# Patient Record
Sex: FEMALE | Race: WHITE | HISPANIC OR LATINO | URBAN - METROPOLITAN AREA
[De-identification: names, ages, dates, MRNs, and addresses within clinical notes are randomized per-mention and may not be internally consistent; named-entity substitution may affect disease eponyms.]

---

## 2023-10-31 ENCOUNTER — EMERGENCY (EMERGENCY)
Facility: HOSPITAL | Age: 48
LOS: 1 days | Discharge: HOME | End: 2023-10-31
Payer: MEDICAID

## 2023-10-31 VITALS
WEIGHT: 164.02 LBS | OXYGEN SATURATION: 99 % | SYSTOLIC BLOOD PRESSURE: 147 MMHG | HEIGHT: 60 IN | RESPIRATION RATE: 18 BRPM | TEMPERATURE: 99 F | DIASTOLIC BLOOD PRESSURE: 84 MMHG | HEART RATE: 110 BPM

## 2023-10-31 LAB
ALBUMIN SERPL ELPH-MCNC: 4.1 G/DL — SIGNIFICANT CHANGE UP (ref 3.3–5)
ALBUMIN SERPL ELPH-MCNC: 4.1 G/DL — SIGNIFICANT CHANGE UP (ref 3.3–5)
ALP SERPL-CCNC: 86 U/L — SIGNIFICANT CHANGE UP (ref 40–120)
ALP SERPL-CCNC: 86 U/L — SIGNIFICANT CHANGE UP (ref 40–120)
ALT FLD-CCNC: 10 U/L — SIGNIFICANT CHANGE UP (ref 10–45)
ALT FLD-CCNC: 10 U/L — SIGNIFICANT CHANGE UP (ref 10–45)
ANION GAP SERPL CALC-SCNC: 11 MMOL/L — SIGNIFICANT CHANGE UP (ref 5–17)
ANION GAP SERPL CALC-SCNC: 11 MMOL/L — SIGNIFICANT CHANGE UP (ref 5–17)
AST SERPL-CCNC: 13 U/L — SIGNIFICANT CHANGE UP (ref 10–40)
AST SERPL-CCNC: 13 U/L — SIGNIFICANT CHANGE UP (ref 10–40)
BILIRUB SERPL-MCNC: 0.2 MG/DL — SIGNIFICANT CHANGE UP (ref 0.2–1.2)
BILIRUB SERPL-MCNC: 0.2 MG/DL — SIGNIFICANT CHANGE UP (ref 0.2–1.2)
BUN SERPL-MCNC: 11 MG/DL — SIGNIFICANT CHANGE UP (ref 7–23)
BUN SERPL-MCNC: 11 MG/DL — SIGNIFICANT CHANGE UP (ref 7–23)
CALCIUM SERPL-MCNC: 9.4 MG/DL — SIGNIFICANT CHANGE UP (ref 8.4–10.5)
CALCIUM SERPL-MCNC: 9.4 MG/DL — SIGNIFICANT CHANGE UP (ref 8.4–10.5)
CHLORIDE SERPL-SCNC: 107 MMOL/L — SIGNIFICANT CHANGE UP (ref 96–108)
CHLORIDE SERPL-SCNC: 107 MMOL/L — SIGNIFICANT CHANGE UP (ref 96–108)
CO2 SERPL-SCNC: 22 MMOL/L — SIGNIFICANT CHANGE UP (ref 22–31)
CO2 SERPL-SCNC: 22 MMOL/L — SIGNIFICANT CHANGE UP (ref 22–31)
CREAT SERPL-MCNC: 0.54 MG/DL — SIGNIFICANT CHANGE UP (ref 0.5–1.3)
CREAT SERPL-MCNC: 0.54 MG/DL — SIGNIFICANT CHANGE UP (ref 0.5–1.3)
EGFR: 112 ML/MIN/1.73M2 — SIGNIFICANT CHANGE UP
EGFR: 112 ML/MIN/1.73M2 — SIGNIFICANT CHANGE UP
GLUCOSE SERPL-MCNC: 109 MG/DL — HIGH (ref 70–99)
GLUCOSE SERPL-MCNC: 109 MG/DL — HIGH (ref 70–99)
HCG SERPL-ACNC: <2 MIU/ML — SIGNIFICANT CHANGE UP
HCG SERPL-ACNC: <2 MIU/ML — SIGNIFICANT CHANGE UP
HCT VFR BLD CALC: 21.2 % — LOW (ref 34.5–45)
HCT VFR BLD CALC: 21.2 % — LOW (ref 34.5–45)
HGB BLD-MCNC: 5.4 G/DL — CRITICAL LOW (ref 11.5–15.5)
HGB BLD-MCNC: 5.4 G/DL — CRITICAL LOW (ref 11.5–15.5)
MCHC RBC-ENTMCNC: 16.5 PG — LOW (ref 27–34)
MCHC RBC-ENTMCNC: 16.5 PG — LOW (ref 27–34)
MCHC RBC-ENTMCNC: 25.5 GM/DL — LOW (ref 32–36)
MCHC RBC-ENTMCNC: 25.5 GM/DL — LOW (ref 32–36)
MCV RBC AUTO: 64.8 FL — LOW (ref 80–100)
MCV RBC AUTO: 64.8 FL — LOW (ref 80–100)
PLATELET # BLD AUTO: 346 K/UL — SIGNIFICANT CHANGE UP (ref 150–400)
PLATELET # BLD AUTO: 346 K/UL — SIGNIFICANT CHANGE UP (ref 150–400)
POTASSIUM SERPL-MCNC: 3.6 MMOL/L — SIGNIFICANT CHANGE UP (ref 3.5–5.3)
POTASSIUM SERPL-MCNC: 3.6 MMOL/L — SIGNIFICANT CHANGE UP (ref 3.5–5.3)
POTASSIUM SERPL-SCNC: 3.6 MMOL/L — SIGNIFICANT CHANGE UP (ref 3.5–5.3)
POTASSIUM SERPL-SCNC: 3.6 MMOL/L — SIGNIFICANT CHANGE UP (ref 3.5–5.3)
PROT SERPL-MCNC: 6.9 G/DL — SIGNIFICANT CHANGE UP (ref 6–8.3)
PROT SERPL-MCNC: 6.9 G/DL — SIGNIFICANT CHANGE UP (ref 6–8.3)
RBC # BLD: 3.27 M/UL — LOW (ref 3.8–5.2)
RBC # BLD: 3.27 M/UL — LOW (ref 3.8–5.2)
RBC # FLD: 22.6 % — HIGH (ref 10.3–14.5)
RBC # FLD: 22.6 % — HIGH (ref 10.3–14.5)
SODIUM SERPL-SCNC: 140 MMOL/L — SIGNIFICANT CHANGE UP (ref 135–145)
SODIUM SERPL-SCNC: 140 MMOL/L — SIGNIFICANT CHANGE UP (ref 135–145)
WBC # BLD: 5.74 K/UL — SIGNIFICANT CHANGE UP (ref 3.8–10.5)
WBC # BLD: 5.74 K/UL — SIGNIFICANT CHANGE UP (ref 3.8–10.5)
WBC # FLD AUTO: 5.74 K/UL — SIGNIFICANT CHANGE UP (ref 3.8–10.5)
WBC # FLD AUTO: 5.74 K/UL — SIGNIFICANT CHANGE UP (ref 3.8–10.5)

## 2023-10-31 PROCEDURE — 99285 EMERGENCY DEPT VISIT HI MDM: CPT

## 2023-10-31 NOTE — ED PROVIDER NOTE - PROGRESS NOTE DETAILS
hgb noted,  Manasa ID [id number] used to discuss result and blood transfusion risks/ benefits. All questions answered, consent signed and placed in chart.  - Blas Cruz PA-C Attending Shira:  will place in cdu

## 2023-10-31 NOTE — ED PROVIDER NOTE - ATTENDING CONTRIBUTION TO CARE
MD Goldstein:  patient seen and evaluated with the PA.  I was present for key portions of the History and Physical, and I agree with the Impression and Plan.      Patient is a 48-year-old female complaining of anemia.  Patient endorses chronic history of menorrhagia but cannot elaborate as to the underlying cause.    Associated symptoms: Patient endorses fatigue, occasional dyspnea on exertion.  No chest pain, no orthopnea     vital signs: Heart rate 110, blood pressure 147/84, respirations 18, temperature 98.6, O2 sat 100% on room air  Gen: Well appearing adult female in NAD  Head: NC/AT  Neck: trachea midline  Resp:  No distress  Abd: nondistended, nontender to palpation  Pelvic:  Ext: no deformities  Neuro:  A&Ox4 appears non focal  Skin:  Warm and dry as visualized  Psych:  Normal affect and mood adult     Medical decision making:   History and physical concerning for symptomatic anemia due to chronic heavy vaginal bleeding.  Will work-up with CBC, type and screen.  Transvaginal ultrasound.  We will discuss possible transfusion in the CDU MD Goldstein:  patient seen and evaluated with the PA.  I was present for key portions of the History and Physical, and I agree with the Impression and Plan.      Patient is a 48-year-old female complaining of anemia.  Patient endorses chronic history of menorrhagia but cannot elaborate as to the underlying cause.    Associated symptoms: Patient endorses fatigue, occasional dyspnea on exertion.  No chest pain, no orthopnea     vital signs: Heart rate 110, blood pressure 147/84, respirations 18, temperature 98.6, O2 sat 100% on room air  Gen: Well appearing adult female in NAD  Head: NC/AT  Neck: trachea midline  Resp:  No distress  Abd: nondistended, nontender to palpation  Pelvic:  unable to visualize cvx  Ext: no deformities  Neuro:  A&Ox4 appears non focal  Skin:  Warm and dry as visualized  Psych:  Normal affect and mood adult     Medical decision making:   History and physical concerning for symptomatic anemia due to chronic heavy vaginal bleeding.    Will work-up with CBC, type and screen.    Transvaginal ultrasound.    We will discuss possible transfusion in the CDU

## 2023-10-31 NOTE — ED ADULT NURSE NOTE - NSFALLUNIVINTERV_ED_ALL_ED
Bed/Stretcher in lowest position, wheels locked, appropriate side rails in place/Call bell, personal items and telephone in reach/Instruct patient to call for assistance before getting out of bed/chair/stretcher/Non-slip footwear applied when patient is off stretcher/[place] to call system/Physically safe environment - no spills, clutter or unnecessary equipment/Purposeful proactive rounding/Room/bathroom lighting operational, light cord in reach

## 2023-10-31 NOTE — ED PROVIDER NOTE - OBJECTIVE STATEMENT
49 yo female hx of     norethindrone 5mg 49 yo female no reported past medical history presents to ED complaining of abnormal heavy vaginal bleeding since 10/14/13.  Patient reports has had this in the past but never required blood transfusions before.  Reports menstrual period is usually abnormal in nature, sometimes has it twice a month and is usually long, however this current episode of bleeding as long as its ever been.  Saw her primary doctor yesterday who did blood work and noted hemoglobin of "6", gave patient dose of norethindrone and advised to come to ED.  Patient took 2 doses of 5mg norethindrone and noted today bleeding did stop, however patient endorses subjective fatigue, body aches and lightheadedness prompting ED visit.  Denies anticoagulant usage, chest pain, syncope, numbness/tingling, back pain, urinary symptoms.

## 2023-10-31 NOTE — ED PROVIDER NOTE - PRINCIPAL DIAGNOSIS
History  Chief Complaint   Patient presents with    Cough     pt c/o cough, URI symptoms x 6 days  c/o chest tightness during cough  Pt states "everyone in the house is sick" with URI symptoms  47-year-old female with past medical history of asthma (previous hospitalizations, but no ICU stays or intubations), who presents to emergency department for productive cough that has been present for the past 6 days  Patient also complains of symptoms of headaches, nasal congestion, rhinorrhea, sore throat, chest tightness  She also complains of intermittent wheezing when her coughing exacerbations occur  Denies shortness of breath or palpitations or leg pain or swelling  Denies fevers or chills  Patient has attempted to resolve the symptoms at home with over-the-counter cough medications, nasal decongestants, and albuterol inhaler, with minimal relief attained  Does have positive ill contacts at home with family members who have similar type symptoms  History provided by:  Patient   used: No    Cough   Associated symptoms: headaches, rhinorrhea, sore throat and wheezing    Associated symptoms: no chest pain, no chills, no ear pain, no fever, no myalgias, no rash and no shortness of breath        Prior to Admission Medications   Prescriptions Last Dose Informant Patient Reported? Taking?    ARTIFICIAL TEARS 1 4 % ophthalmic solution   Yes No   Sig: instill 1 drop into both eyes four times a day if needed   HYDROcodone-acetaminophen (NORCO) 5-325 mg per tablet   Yes No   Sig: Take 1-2 tablets by mouth every 6 (six) hours   Lansoprazole (PREVACID PO)   Yes No   Sig: Take by mouth   Montelukast Sodium (SINGULAIR PO)   Yes No   Sig: Take by mouth   albuterol (2 5 mg/3 mL) 0 083 % nebulizer solution   Yes Yes   Sig: Inhale   albuterol (PROVENTIL HFA,VENTOLIN HFA) 90 mcg/act inhaler   Yes Yes   Sig: Inhale 2 puffs every 6 (six) hours as needed for wheezing   albuterol (PROVENTIL HFA,VENTOLIN HFA) 90 mcg/act inhaler   Yes Yes   Sig: Inhale 2 puffs every 6 (six) hours as needed for wheezing   bisacodyl (DULCOLAX) 5 mg EC tablet   Yes No   Sig: Take by mouth   docusate sodium (DULCOLAX) 100 mg capsule   Yes No   Sig: Take by mouth daily   doxylamine (UNISOM) 25 MG tablet   Yes No   Sig: Take by mouth   ferrous sulfate 325 (65 Fe) mg tablet   Yes No   Sig: Take 1 tablet by mouth 2 (two) times a day   fluticasone (FLONASE) 50 mcg/act nasal spray   Yes No   Si spray into each nostril daily   ibuprofen (MOTRIN) 600 mg tablet   Yes No   Sig: Take by mouth   naproxen (NAPROSYN) 500 mg tablet   Yes No   Sig: Take 500 mg by mouth   polyethylene glycol (MIRALAX) 17 g packet   No No   Sig: Take 17 g by mouth daily as needed (constipation)      Facility-Administered Medications: None       Past Medical History:   Diagnosis Date    Anemia     Asthma     Bilateral breast cysts     last assessed    Amy Blend Amy Blend 15    resolved   11/3/17     Cervical cancer, FIGO stage I (HCC)     Constipation     Seizures (HCC)     URI (upper respiratory infection)     under additonal type - Chronic       Past Surgical History:   Procedure Laterality Date     SECTION      INGUINAL HERNIA REPAIR      last assessed   12/17/15      SC COLONOSCOPY FLX DX W/COLLJ SPEC WHEN PFRMD N/A 9/15/2017    Procedure: COLONOSCOPY;  Surgeon: Jeremiah Rhodes MD;  Location: [facility]; Service: Gastroenterology    SKIN BIOPSY      last assessed         UMBILICAL HERNIA REPAIR      last assessed   12/17/15     URETHRAL DIVERTICULECTOMY      excision of urethral diverticulum    last assessed   12/17/15       Family History   Problem Relation Age of Onset    Hyperlipidemia Mother     Hypertension Mother    [de-identified] Breast cancer Mother         neoplasm/dx first age 39, mastectomy age 36, did have lymph biopsy positive with recurrance age around age 61    Hyperlipidemia Maternal Grandmother     Hypertension Maternal Grandmother  Leukemia Maternal Grandmother      I have reviewed and agree with the history as documented  Social History     Tobacco Use    Smoking status: Never Smoker    Smokeless tobacco: Never Used   Substance Use Topics    Alcohol use: Yes     Comment: every other day/social per allscript    Drug use: No        Review of Systems   Constitutional: Negative for chills and fever  HENT: Positive for congestion, rhinorrhea and sore throat  Negative for ear pain and trouble swallowing  Eyes: Negative  Respiratory: Positive for cough, chest tightness and wheezing  Negative for choking and shortness of breath  Cardiovascular: Negative for chest pain, palpitations and leg swelling  Gastrointestinal: Negative for abdominal pain, constipation, diarrhea, nausea and vomiting  Genitourinary: Negative for dysuria, flank pain, frequency, hematuria and urgency  Musculoskeletal: Negative for arthralgias, back pain, gait problem, joint swelling, myalgias, neck pain and neck stiffness  Skin: Negative for color change, pallor, rash and wound  Neurological: Positive for headaches  Negative for dizziness, syncope, weakness, light-headedness and numbness  Physical Exam  Physical Exam   Constitutional: She is oriented to person, place, and time  She appears well-developed and well-nourished  No distress  HENT:   Head: Normocephalic and atraumatic  Mouth/Throat: No oropharyngeal exudate  Posterior oropharynx with erythema  No edema or uvular deviation  No have tonsillar hypertrophy or overlying exudates  There is postnasal drip  Bilateral TMs are translucent and nonbulging  Eyes: Pupils are equal, round, and reactive to light  Conjunctivae and EOM are normal    Neck: Normal range of motion  Neck supple  Cardiovascular: Normal rate, regular rhythm and intact distal pulses  Pulmonary/Chest: Effort normal and breath sounds normal  No stridor  She has no wheezes  She has no rales  Abdominal: Soft  Bowel sounds are normal  She exhibits no distension  There is no tenderness  There is no rebound and no guarding  Musculoskeletal: Normal range of motion  She exhibits no edema or tenderness  Lymphadenopathy:     She has cervical adenopathy  Neurological: She is alert and oriented to person, place, and time  No cranial nerve deficit or sensory deficit  She exhibits normal muscle tone  Coordination normal    Skin: Skin is warm and dry  Capillary refill takes less than 2 seconds  She is not diaphoretic  Psychiatric: She has a normal mood and affect  Her behavior is normal    Nursing note and vitals reviewed  Vital Signs  ED Triage Vitals [02/13/19 1310]   Temperature Pulse Respirations Blood Pressure SpO2   (!) 97 4 °F (36 3 °C) 93 18 161/89 100 %      Temp src Heart Rate Source Patient Position - Orthostatic VS BP Location FiO2 (%)   -- Monitor Sitting -- --      Pain Score       6           Vitals:    02/13/19 1310   BP: 161/89   Pulse: 93   Patient Position - Orthostatic VS: Sitting       Visual Acuity      ED Medications  Medications   predniSONE tablet 50 mg (50 mg Oral Given 2/13/19 1419)       Diagnostic Studies  Results Reviewed     None                 XR chest 2 views   ED Interpretation by Thong Sotelo PA-C (02/13 1437)   No acute cardiopulmonary disease  Final Result by Michelle Martinez MD (02/13 1716)      No acute cardiopulmonary disease  This report is in agreement with the preliminary interpretation  Workstation performed: [id number]                    Procedures  Procedures       Phone Contacts  ED Phone Contact    ED Course  ED Course as of Feb 13 1726   Wed Feb 13, 2019   1445 Patient feels much improved following duoneb and prednisone in the Ed  Peak flow after neb is 400                                    MDM  Number of Diagnoses or Management Options  Asthma exacerbation:   Viral URI with cough:   Diagnosis management comments: Differential Diagnosis includes but is not limited to:  Upper respiratory infection, bronchitis, asthma exacerbation, pneumonia  Chest x-ray is negative for acute cardiopulmonary disease  Patient feels much improved following DuoNeb in the emergency department  As patient states that she has been wheezing at home, will start her on a short course of prednisone  Patient also instructed on symptomatic therapy  She has been given a prescription for nebulizer machine as hers broke at home, and she feels that she gains much relief with use of nebulizer instead of inhaler  Patient likely has a mild asthma exacerbation due to upper respiratory infection with cough  Amount and/or Complexity of Data Reviewed  Tests in the radiology section of CPT®: ordered and reviewed  Independent visualization of images, tracings, or specimens: yes        Disposition  Final diagnoses:   Asthma exacerbation   Viral URI with cough     Time reflects when diagnosis was documented in both MDM as applicable and the Disposition within this note     Time User Action Codes Description Comment    2/13/2019  2:45 PM Amber Campbell [J06 9] Upper respiratory infection     2/13/2019  2:45 PM Amber Campbell [J45 901] Asthma exacerbation     2/13/2019  2:46 PM Amber Childers Modify [J45 901] Asthma exacerbation     2/13/2019  2:46 PM Nayana Callaway Remove [J06 9] Upper respiratory infection     2/13/2019  2:46 PM Nayana Callaway Add [J06 9,  B97 89] Viral URI with cough       ED Disposition     ED Disposition Condition Date/Time Comment    Discharge Stable Wed Feb 13, 2019  2:45 PM Genevieve Olivas discharge to home/self care  Follow-up Information     Follow up With Specialties Details Why Contact Info    Tod Bañuelos PA-C Internal Medicine, Physician Assistant  As needed, If symptoms worsen, cough and  E   [address] [facility] [address] [facility]  [phone]            Discharge Medication List as of 2/13/2019  2:52 PM      START taking these medications    Details albuterol (5 mg/mL) 0 5 % nebulizer solution Take 0 5 mL (2 5 mg total) by nebulization every 6 (six) hours as needed for wheezing, Starting Wed 2/13/2019, Print      menthol-cetylpyridinium (CEPACOL) 3 MG lozenge Take 1 lozenge (3 mg total) by mouth as needed for sore throat, Starting Wed 2/13/2019, Print      predniSONE 50 mg tablet Take 1 tablet (50 mg total) by mouth daily, Starting Thu 2/14/2019, Print      pseudoephedrine (SUDAFED) 60 mg tablet Take 1 tablet (60 mg total) by mouth every 8 (eight) hours as needed for congestion, Starting Wed 2/13/2019, Print      Respiratory Therapy Supplies (NEBULIZER COMPRESSOR) KIT by Does not apply route once for 1 dose Please dispense 1 nebulizer machine with mask and tubing for patient , Starting Wed 2/13/2019, Print         CONTINUE these medications which have NOT CHANGED    Details   albuterol (2 5 mg/3 mL) 0 083 % nebulizer solution Inhale, Starting Wed 6/28/2017, Historical Med      !! albuterol (PROVENTIL HFA,VENTOLIN HFA) 90 mcg/act inhaler Inhale 2 puffs every 6 (six) hours as needed for wheezing, Historical Med      !! albuterol (PROVENTIL HFA,VENTOLIN HFA) 90 mcg/act inhaler Inhale 2 puffs every 6 (six) hours as needed for wheezing, Historical Med      ARTIFICIAL TEARS 1 4 % ophthalmic solution instill 1 drop into both eyes four times a day if needed, Historical Med      bisacodyl (DULCOLAX) 5 mg EC tablet Take by mouth, Starting Fri 9/8/2017, Historical Med      docusate sodium (DULCOLAX) 100 mg capsule Take by mouth daily, Starting Thu 9/14/2017, Historical Med      doxylamine (UNISOM) 25 MG tablet Take by mouth, Historical Med      ferrous sulfate 325 (65 Fe) mg tablet Take 1 tablet by mouth 2 (two) times a day, Starting Tue 9/12/2017, Historical Med      fluticasone (FLONASE) 50 mcg/act nasal spray 1 spray into each nostril daily, Historical Med      HYDROcodone-acetaminophen (NORCO) 5-325 mg per tablet Take 1-2 tablets by mouth every 6 (six) hours, Starting Tue 10/27/2015, Historical Med      ibuprofen (MOTRIN) 600 mg tablet Take by mouth, Starting Wed 3/8/2017, Historical Med      Lansoprazole (PREVACID PO) Take by mouth, Historical Med      Montelukast Sodium (SINGULAIR PO) Take by mouth, Historical Med      naproxen (NAPROSYN) 500 mg tablet Take 500 mg by mouth, Starting Tue 10/27/2015, Historical Med      polyethylene glycol (MIRALAX) 17 g packet Take 17 g by mouth daily as needed (constipation), Starting 12/21/2016, Until Discontinued, Print       !! - Potential duplicate medications found  Please discuss with provider  No discharge procedures on file      ED Provider  Electronically Signed by           Genet Correa PA-C  02/13/19 5884 Vaginal bleeding

## 2023-10-31 NOTE — ED ADULT NURSE NOTE - OBJECTIVE STATEMENT
Patient is a 48 year old female complaining of abnormal lab results. Patient reports heavy vaginal bleeding since 10/14/23 - went to her PCP where they did blood work and found her hemoglobin to be "6" was given norethindrone and advised to come to ED. Patient does report bleeding has stopped since taking the medications but still endorsing fatigue, body aches and lightheadedness. On assessment A&Ox4, breathing comfortably on room air, no accessory muscle use, ambulating independently, sinus tach on the cardiac monitor, no JVD, no edema noted, strong bilateral peripheral pulses, abdomen soft nontender, skin warm and normal for race.

## 2023-10-31 NOTE — ED PROVIDER NOTE - CLINICAL SUMMARY MEDICAL DECISION MAKING FREE TEXT BOX
Medical decision making:   History and physical concerning for symptomatic anemia due to chronic heavy vaginal bleeding.    Will work-up with CBC, type and screen.    Transvaginal ultrasound.    We will discuss possible transfusion in the CDU

## 2023-10-31 NOTE — ED ADULT TRIAGE NOTE - CHIEF COMPLAINT QUOTE
Pt c/o "heavy menstrual cycle started on 9/14/23. Had blood work yesterday told Hemoglobin 6.5. + dizzy/lightheaded/tired"

## 2023-11-01 VITALS
OXYGEN SATURATION: 99 % | HEART RATE: 78 BPM | TEMPERATURE: 98 F | RESPIRATION RATE: 16 BRPM | DIASTOLIC BLOOD PRESSURE: 67 MMHG | SYSTOLIC BLOOD PRESSURE: 142 MMHG

## 2023-11-01 LAB
ANISOCYTOSIS BLD QL: SIGNIFICANT CHANGE UP
ANISOCYTOSIS BLD QL: SIGNIFICANT CHANGE UP
BASOPHILS # BLD AUTO: 0.03 K/UL — SIGNIFICANT CHANGE UP (ref 0–0.2)
BASOPHILS # BLD AUTO: 0.03 K/UL — SIGNIFICANT CHANGE UP (ref 0–0.2)
BASOPHILS # BLD AUTO: 0.05 K/UL — SIGNIFICANT CHANGE UP (ref 0–0.2)
BASOPHILS # BLD AUTO: 0.05 K/UL — SIGNIFICANT CHANGE UP (ref 0–0.2)
BASOPHILS NFR BLD AUTO: 0.7 % — SIGNIFICANT CHANGE UP (ref 0–2)
BASOPHILS NFR BLD AUTO: 0.7 % — SIGNIFICANT CHANGE UP (ref 0–2)
BASOPHILS NFR BLD AUTO: 0.9 % — SIGNIFICANT CHANGE UP (ref 0–2)
BASOPHILS NFR BLD AUTO: 0.9 % — SIGNIFICANT CHANGE UP (ref 0–2)
BLD GP AB SCN SERPL QL: NEGATIVE — SIGNIFICANT CHANGE UP
BLD GP AB SCN SERPL QL: NEGATIVE — SIGNIFICANT CHANGE UP
DACRYOCYTES BLD QL SMEAR: SLIGHT — SIGNIFICANT CHANGE UP
DACRYOCYTES BLD QL SMEAR: SLIGHT — SIGNIFICANT CHANGE UP
ELLIPTOCYTES BLD QL SMEAR: SLIGHT — SIGNIFICANT CHANGE UP
ELLIPTOCYTES BLD QL SMEAR: SLIGHT — SIGNIFICANT CHANGE UP
EOSINOPHIL # BLD AUTO: 0.1 K/UL — SIGNIFICANT CHANGE UP (ref 0–0.5)
EOSINOPHIL # BLD AUTO: 0.1 K/UL — SIGNIFICANT CHANGE UP (ref 0–0.5)
EOSINOPHIL # BLD AUTO: 0.13 K/UL — SIGNIFICANT CHANGE UP (ref 0–0.5)
EOSINOPHIL # BLD AUTO: 0.13 K/UL — SIGNIFICANT CHANGE UP (ref 0–0.5)
EOSINOPHIL NFR BLD AUTO: 1.8 % — SIGNIFICANT CHANGE UP (ref 0–6)
EOSINOPHIL NFR BLD AUTO: 1.8 % — SIGNIFICANT CHANGE UP (ref 0–6)
EOSINOPHIL NFR BLD AUTO: 3 % — SIGNIFICANT CHANGE UP (ref 0–6)
EOSINOPHIL NFR BLD AUTO: 3 % — SIGNIFICANT CHANGE UP (ref 0–6)
GIANT PLATELETS BLD QL SMEAR: PRESENT — SIGNIFICANT CHANGE UP
GIANT PLATELETS BLD QL SMEAR: PRESENT — SIGNIFICANT CHANGE UP
HCT VFR BLD CALC: 28.2 % — LOW (ref 34.5–45)
HCT VFR BLD CALC: 28.2 % — LOW (ref 34.5–45)
HGB BLD-MCNC: 7.9 G/DL — LOW (ref 11.5–15.5)
HGB BLD-MCNC: 7.9 G/DL — LOW (ref 11.5–15.5)
HYPOCHROMIA BLD QL: SIGNIFICANT CHANGE UP
HYPOCHROMIA BLD QL: SIGNIFICANT CHANGE UP
IMM GRANULOCYTES NFR BLD AUTO: 1.1 % — HIGH (ref 0–0.9)
IMM GRANULOCYTES NFR BLD AUTO: 1.1 % — HIGH (ref 0–0.9)
LYMPHOCYTES # BLD AUTO: 1.67 K/UL — SIGNIFICANT CHANGE UP (ref 1–3.3)
LYMPHOCYTES # BLD AUTO: 1.67 K/UL — SIGNIFICANT CHANGE UP (ref 1–3.3)
LYMPHOCYTES # BLD AUTO: 2.19 K/UL — SIGNIFICANT CHANGE UP (ref 1–3.3)
LYMPHOCYTES # BLD AUTO: 2.19 K/UL — SIGNIFICANT CHANGE UP (ref 1–3.3)
LYMPHOCYTES # BLD AUTO: 38.1 % — SIGNIFICANT CHANGE UP (ref 13–44)
LYMPHOCYTES # BLD AUTO: 38.1 % — SIGNIFICANT CHANGE UP (ref 13–44)
LYMPHOCYTES # BLD AUTO: 38.2 % — SIGNIFICANT CHANGE UP (ref 13–44)
LYMPHOCYTES # BLD AUTO: 38.2 % — SIGNIFICANT CHANGE UP (ref 13–44)
MANUAL SMEAR VERIFICATION: SIGNIFICANT CHANGE UP
MANUAL SMEAR VERIFICATION: SIGNIFICANT CHANGE UP
MCHC RBC-ENTMCNC: 19.6 PG — LOW (ref 27–34)
MCHC RBC-ENTMCNC: 19.6 PG — LOW (ref 27–34)
MCHC RBC-ENTMCNC: 28 GM/DL — LOW (ref 32–36)
MCHC RBC-ENTMCNC: 28 GM/DL — LOW (ref 32–36)
MCV RBC AUTO: 69.8 FL — LOW (ref 80–100)
MCV RBC AUTO: 69.8 FL — LOW (ref 80–100)
MICROCYTES BLD QL: SIGNIFICANT CHANGE UP
MICROCYTES BLD QL: SIGNIFICANT CHANGE UP
MONOCYTES # BLD AUTO: 0.05 K/UL — SIGNIFICANT CHANGE UP (ref 0–0.9)
MONOCYTES # BLD AUTO: 0.05 K/UL — SIGNIFICANT CHANGE UP (ref 0–0.9)
MONOCYTES # BLD AUTO: 0.36 K/UL — SIGNIFICANT CHANGE UP (ref 0–0.9)
MONOCYTES # BLD AUTO: 0.36 K/UL — SIGNIFICANT CHANGE UP (ref 0–0.9)
MONOCYTES NFR BLD AUTO: 0.9 % — LOW (ref 2–14)
MONOCYTES NFR BLD AUTO: 0.9 % — LOW (ref 2–14)
MONOCYTES NFR BLD AUTO: 8.2 % — SIGNIFICANT CHANGE UP (ref 2–14)
MONOCYTES NFR BLD AUTO: 8.2 % — SIGNIFICANT CHANGE UP (ref 2–14)
NEUTROPHILS # BLD AUTO: 2.14 K/UL — SIGNIFICANT CHANGE UP (ref 1.8–7.4)
NEUTROPHILS # BLD AUTO: 2.14 K/UL — SIGNIFICANT CHANGE UP (ref 1.8–7.4)
NEUTROPHILS # BLD AUTO: 3.34 K/UL — SIGNIFICANT CHANGE UP (ref 1.8–7.4)
NEUTROPHILS # BLD AUTO: 3.34 K/UL — SIGNIFICANT CHANGE UP (ref 1.8–7.4)
NEUTROPHILS NFR BLD AUTO: 48.9 % — SIGNIFICANT CHANGE UP (ref 43–77)
NEUTROPHILS NFR BLD AUTO: 48.9 % — SIGNIFICANT CHANGE UP (ref 43–77)
NEUTROPHILS NFR BLD AUTO: 58.2 % — SIGNIFICANT CHANGE UP (ref 43–77)
NEUTROPHILS NFR BLD AUTO: 58.2 % — SIGNIFICANT CHANGE UP (ref 43–77)
NRBC # BLD: 0 /100 WBCS — SIGNIFICANT CHANGE UP (ref 0–0)
NRBC # BLD: 0 /100 WBCS — SIGNIFICANT CHANGE UP (ref 0–0)
OVALOCYTES BLD QL SMEAR: SLIGHT — SIGNIFICANT CHANGE UP
OVALOCYTES BLD QL SMEAR: SLIGHT — SIGNIFICANT CHANGE UP
PLAT MORPH BLD: NORMAL — SIGNIFICANT CHANGE UP
PLAT MORPH BLD: NORMAL — SIGNIFICANT CHANGE UP
PLATELET # BLD AUTO: 319 K/UL — SIGNIFICANT CHANGE UP (ref 150–400)
PLATELET # BLD AUTO: 319 K/UL — SIGNIFICANT CHANGE UP (ref 150–400)
POIKILOCYTOSIS BLD QL AUTO: SLIGHT — SIGNIFICANT CHANGE UP
POIKILOCYTOSIS BLD QL AUTO: SLIGHT — SIGNIFICANT CHANGE UP
RBC # BLD: 4.04 M/UL — SIGNIFICANT CHANGE UP (ref 3.8–5.2)
RBC # BLD: 4.04 M/UL — SIGNIFICANT CHANGE UP (ref 3.8–5.2)
RBC # FLD: 26.1 % — HIGH (ref 10.3–14.5)
RBC # FLD: 26.1 % — HIGH (ref 10.3–14.5)
RBC BLD AUTO: ABNORMAL
RBC BLD AUTO: ABNORMAL
RH IG SCN BLD-IMP: POSITIVE — SIGNIFICANT CHANGE UP
RH IG SCN BLD-IMP: POSITIVE — SIGNIFICANT CHANGE UP
SCHISTOCYTES BLD QL AUTO: SLIGHT — SIGNIFICANT CHANGE UP
SCHISTOCYTES BLD QL AUTO: SLIGHT — SIGNIFICANT CHANGE UP
WBC # BLD: 4.38 K/UL — SIGNIFICANT CHANGE UP (ref 3.8–10.5)
WBC # BLD: 4.38 K/UL — SIGNIFICANT CHANGE UP (ref 3.8–10.5)
WBC # FLD AUTO: 4.38 K/UL — SIGNIFICANT CHANGE UP (ref 3.8–10.5)
WBC # FLD AUTO: 4.38 K/UL — SIGNIFICANT CHANGE UP (ref 3.8–10.5)

## 2023-11-01 PROCEDURE — 76830 TRANSVAGINAL US NON-OB: CPT

## 2023-11-01 PROCEDURE — P9016: CPT

## 2023-11-01 PROCEDURE — G0378: CPT

## 2023-11-01 PROCEDURE — 99285 EMERGENCY DEPT VISIT HI MDM: CPT | Mod: 25

## 2023-11-01 PROCEDURE — 84702 CHORIONIC GONADOTROPIN TEST: CPT

## 2023-11-01 PROCEDURE — 76830 TRANSVAGINAL US NON-OB: CPT | Mod: 26

## 2023-11-01 PROCEDURE — 86901 BLOOD TYPING SEROLOGIC RH(D): CPT

## 2023-11-01 PROCEDURE — 86850 RBC ANTIBODY SCREEN: CPT

## 2023-11-01 PROCEDURE — 36415 COLL VENOUS BLD VENIPUNCTURE: CPT

## 2023-11-01 PROCEDURE — 85025 COMPLETE CBC W/AUTO DIFF WBC: CPT

## 2023-11-01 PROCEDURE — 80053 COMPREHEN METABOLIC PANEL: CPT

## 2023-11-01 PROCEDURE — 99236 HOSP IP/OBS SAME DATE HI 85: CPT

## 2023-11-01 PROCEDURE — 86900 BLOOD TYPING SEROLOGIC ABO: CPT

## 2023-11-01 PROCEDURE — 36430 TRANSFUSION BLD/BLD COMPNT: CPT

## 2023-11-01 PROCEDURE — 86923 COMPATIBILITY TEST ELECTRIC: CPT

## 2023-11-01 RX ORDER — NORETHINDRONE 0.35 MG/1
5 TABLET ORAL
Refills: 0 | Status: DISCONTINUED | OUTPATIENT
Start: 2023-11-01 | End: 2023-11-04

## 2023-11-01 RX ORDER — ATORVASTATIN CALCIUM 80 MG/1
80 TABLET, FILM COATED ORAL AT BEDTIME
Refills: 0 | Status: DISCONTINUED | OUTPATIENT
Start: 2023-11-01 | End: 2023-11-01

## 2023-11-01 RX ADMIN — NORETHINDRONE 5 MILLIGRAM(S): 0.35 TABLET ORAL at 08:27

## 2023-11-01 NOTE — ED CDU PROVIDER INITIAL DAY NOTE - PROGRESS NOTE DETAILS
CDU PROGRESS NOTE DONIS HDEZ: Patient resting comfortably no acute complaint.  Reports minimal vaginal bleeding.  Is on Aygestin 5 mg twice a day.  Has follow-up with her OB/GYN plan.  Second unit PRBCs completed at this time, pending repeat CBC in approximately 3 hours. CDU PROGRESS NOTE DONIS HDEZ: CBC trending upward appropriately, pt also reports improvement of symptoms. seen by Dr Georgi morris for dc. Will dc with follow up. Discussed plan and return precautions with patient who understands and agrees. All questions answered.

## 2023-11-01 NOTE — ED ADULT NURSE REASSESSMENT NOTE - NS ED NURSE REASSESS COMMENT FT1
Pt received from MIREYA Caballero. Pt A&) X4, oriented to CDU & plan of care discussed. Pt in CDU for Blood transfusion. Second unit of PRBC in progress, tolerating well. As per pt no vaginal bleeding at this time, Pt denies any chest pain, SOB, dizziness or palpitations. V/S stable, pt afebrile,  IV in place, patent and free of signs of infiltration. Pt resting in bed. Safety & comfort measures maintained. Call bell in reach. Will continue to monitor.

## 2023-11-01 NOTE — ED ADULT NURSE REASSESSMENT NOTE - NS ED NURSE REASSESS COMMENT FT1
07.00 Received the Pt from  MIREYA Licea  Pt is Observed for Vag bleed /anemia For Repeat CBC @ 10.30 am  post 2nd unit of blood transfusion . Received the Pt A&OX 4  Pt obeys commands Viviana N/V/D fever chills cp SOB   Comfort care & safety measures continued  IV site looks clean & dry no signs of infiltration noted pt denies  pain IV site .Pt is oriented to the unit Plan of care explained .  Pt is advised to call for help  call bell with in the reach pt verbalized the understanding .  pending CDU  MD lopez . GCS 15/15 A&OX 4 PERRLA  size 3 Strong upper & lower extremities steady gait  Pt is ambulatory & independent   No facial droop  No Hand Leg drop denies numbness tingling   Pt states she is just spotting per vagina Plan of care ongoing

## 2023-11-01 NOTE — ED CDU PROVIDER DISPOSITION NOTE - CLINICAL COURSE
49 yo female no reported past medical history presents to ED complaining of abnormal heavy vaginal bleeding since 10/14/13.  Patient reports has had this in the past but never required blood transfusions before.  Reports menstrual period is usually abnormal in nature, sometimes has it twice a month and is usually long, however this current episode of bleeding as long as its ever been.  Saw her primary doctor yesterday who did blood work and noted hemoglobin of "6", gave patient dose of norethindrone and advised to come to ED.  Patient took 2 doses of 5mg norethindrone and noted today bleeding did stop, however patient endorses subjective fatigue, body aches and lightheadedness prompting ED visit.  Denies anticoagulant usage, chest pain, syncope, numbness/tingling, back pain, urinary symptoms.  In ED, patient had laboratory significant for H/H 5.4/21.2. Pelvic exam per ED consistent w/ BLOOD IN VAGINAL VAULT, scant blood lining vaginal vault. no pooling. Pt consented for transfusion and sent to CDU for frequent reeval, vitals q 4hrs, 2 units PRBC, repeat H/H. 49 yo female no reported past medical history presents to ED complaining of abnormal heavy vaginal bleeding since 10/14/13.  Patient reports has had this in the past but never required blood transfusions before.  Reports menstrual period is usually abnormal in nature, sometimes has it twice a month and is usually long, however this current episode of bleeding as long as its ever been.  Saw her primary doctor yesterday who did blood work and noted hemoglobin of "6", gave patient dose of norethindrone and advised to come to ED.  Patient took 2 doses of 5mg norethindrone and noted today bleeding did stop, however patient endorses subjective fatigue, body aches and lightheadedness prompting ED visit.  Denies anticoagulant usage, chest pain, syncope, numbness/tingling, back pain, urinary symptoms.  In ED, patient had laboratory significant for H/H 5.4/21.2. Pelvic exam per ED consistent w/ BLOOD IN VAGINAL VAULT, scant blood lining vaginal vault. no pooling. Pt consented for transfusion and sent to CDU for frequent reeval, vitals q 4hrs, 2 units PRBC, repeat H/H.  In CDU H/H uptrended appropriately with transfusion of PRBC x2, vitals stable. Discussed plan and return precautions with patient who understands and agrees. All questions answered.

## 2023-11-01 NOTE — ED CDU PROVIDER DISPOSITION NOTE - NSFOLLOWUPINSTRUCTIONS_ED_ALL_ED_FT
(1) You will need to follow-up with your PMD () in 2-3 days for your anemia. A copy of your results were given to you to bring to your appt.  (2) Read attached discharge paperwork.  (3) Drink plenty of fluids to stay hydrated.  (4) Return to ER for lightheaded/dizziness, chest pain, palpitations, shortness of breath, active bleeding, or any other concerns. Please follow up with your primary care doctor within 1 week.  Follow up with your OBGYN within 1 week.    *Bring all printed lab/test results to your appointment(s).*    Continue to take oral iron supplements daily.    Return for worsening heavy bleeding, chest pain, shortness of breath, dizziness, loss of consciousness, or any other concerns. Please follow up with your primary care doctor and your OBGYN within 2-3 days.    *Bring all printed lab/test results to your appointment(s).*    Continue to take oral iron supplements daily.    Return for worsening heavy bleeding, chest pain, shortness of breath, dizziness, loss of consciousness, or any other concerns.

## 2023-11-01 NOTE — ED CDU PROVIDER INITIAL DAY NOTE - CLINICAL SUMMARY MEDICAL DECISION MAKING FREE TEXT BOX
Medical decision making:   History and physical concerning for symptomatic anemia due to chronic heavy vaginal bleeding.  Will work-up with CBC, type and screen.  Transvaginal ultrasound.  We will discuss possible transfusion in the CDU.

## 2023-11-01 NOTE — ED CDU PROVIDER INITIAL DAY NOTE - ATTENDING APP SHARED VISIT CONTRIBUTION OF CARE
MD Goldstein:  I have personally performed a face to face diagnostic evaluation on this patient with the PA.  I have reviewed the ACP note and agree with the history, exam, and plan of care, except as noted.  History and Exam by me shows:     48-year-old female complaining of anemia.  Patient endorses chronic history of menorrhagia but cannot elaborate as to the underlying cause.    Associated symptoms: Patient endorses fatigue, occasional dyspnea on exertion.  No chest pain, no orthopnea     vital signs: Heart rate 110, blood pressure 147/84, respirations 18, temperature 98.6, O2 sat 100% on room air  Gen: Well appearing adult female in NAD  Head: NC/AT  Neck: trachea midline  Resp:  No distress  Abd: nondistended, nontender to palpation  Ext: no deformities  Neuro:  A&Ox4 appears non focal  Skin:  Warm and dry as visualized  Psych:  Normal affect and mood adult     Medical decision making:   History and physical concerning for symptomatic anemia due to chronic heavy vaginal bleeding.  Will work-up with CBC, type and screen.  Transvaginal ultrasound.  We will discuss possible transfusion in the CDU.

## 2023-11-01 NOTE — ED CDU PROVIDER DISPOSITION NOTE - ATTENDING APP SHARED VISIT CONTRIBUTION OF CARE
Jose Laureano MD:   I personally saw the patient and performed a substantive portion of the visit including all aspects of the medical decision making.    Summary: 48-year-old female who presents with abnormal heavy vaginal bleeding for last 2 weeks, and has had a history of this in the past but has not required blood transfusions before.  Reports abnormal menstrual periods, sometimes twice a month and usually prolonged.    In the ED, found to have H&H of 5.4/21.2, was consented for blood. Transvaginal ultrasound showed fibroid uterus, thickened heterogeneous endometrium with findings that may be related to menstrual changes versus endometrial carcinoma, endometrial hyperplasia or polyps.    Patient was placed in the CDU for further monitoring, frequent reassessments, Q4 hour vital signs, 2 units of PRBC, repeat H&H.      Patient was evaluated by me in the morning, and feels improved symptoms after receiving PRBC.  On examination, patient with stable vitals, mild conjunctival pallor, no abdominal tenderness, negative CVA tenderness.    Patient was given a copy of their results, and we have discussed all abnormal findings on the labs and imaging with the patient. They are aware of the findings of anemia and abnormal ultrasound, and requires rapid follow-up within 2 weeks for repeat imaging because endometrial cancer is a possibility. They show good understanding, and are agreeable to follow-up with her OB/GYN and primary care physician for further evaluation to address these abnormal findings. Jose Laureano MD:   I personally saw the patient and performed a substantive portion of the visit including all aspects of the medical decision making.    Summary: 48-year-old female who presents with abnormal heavy vaginal bleeding for last 2 weeks, and has had a history of this in the past but has not required blood transfusions before.  Reports abnormal menstrual periods, sometimes twice a month and usually prolonged.    In the ED, found to have H&H of 5.4/21.2, was consented for blood. Transvaginal ultrasound showed fibroid uterus, thickened heterogeneous endometrium with findings that may be related to menstrual changes versus endometrial carcinoma, endometrial hyperplasia or polyps.    Patient was placed in the CDU for further monitoring, frequent reassessments, Q4 hour vital signs, 2 units of PRBC, repeat H&H.      Patient was evaluated by me in the morning, and feels improved symptoms after receiving PRBC.  On examination, patient with stable vitals, mild conjunctival pallor, no abdominal tenderness, negative CVA tenderness. Repeat H&H showed improvement from 5.4-7.9, responded appropriately.  Patient continues to feel well.  Changes position without any orthostatic symptoms.  Stable vitals.    Patient was given a copy of their results, and we have discussed all abnormal findings on the labs and imaging with the patient. They are aware of the findings of anemia and abnormal ultrasound, and requires rapid follow-up within 2 weeks for repeat imaging because endometrial cancer is a possibility. They show good understanding, and are agreeable to follow-up with her OB/GYN and primary care physician for further evaluation to address these abnormal findings.    Stable for discharge with close follow-up and strict return precautions. Discussed the indications and side-effects of applicable medications. The patient has been informed of all concerning signs and symptoms to return to Emergency Department, the necessity to follow up with PMD and OB/GYN within 2-3 days was explained, or to return to the ED if unable to follow-up appropriately, and the patient reports understanding of above with capacity and insight.

## 2023-11-01 NOTE — ED ADULT NURSE REASSESSMENT NOTE - NSFALLUNIVINTERV_ED_ALL_ED
Bed/Stretcher in lowest position, wheels locked, appropriate side rails in place/Call bell, personal items and telephone in reach/Instruct patient to call for assistance before getting out of bed/chair/stretcher/Non-slip footwear applied when patient is off stretcher/[place] to call system/Physically safe environment - no spills, clutter or unnecessary equipment/Purposeful proactive rounding/Room/bathroom lighting operational, light cord in reach

## 2023-11-01 NOTE — ED CDU PROVIDER INITIAL DAY NOTE - OBJECTIVE STATEMENT
47 yo female no reported past medical history presents to ED complaining of abnormal heavy vaginal bleeding since 10/14/13.  Patient reports has had this in the past but never required blood transfusions before.  Reports menstrual period is usually abnormal in nature, sometimes has it twice a month and is usually long, however this current episode of bleeding as long as its ever been.  Saw her primary doctor yesterday who did blood work and noted hemoglobin of "6", gave patient dose of norethindrone and advised to come to ED.  Patient took 2 doses of 5mg norethindrone and noted today bleeding did stop, however patient endorses subjective fatigue, body aches and lightheadedness prompting ED visit.  Denies anticoagulant usage, chest pain, syncope, numbness/tingling, back pain, urinary symptoms.  In ED, patient had laboratory significant for H/H 5.4/21.2. Pelvic exam per ED consistent w/ BLOOD IN VAGINAL VAULT, scant blood lining vaginal vault. no pooling. Pt consented for transfusion and sent to CDU for frequent reeval, vitals q 4hrs, 2 units PRBC, repeat H/H.

## 2023-11-19 ENCOUNTER — EMERGENCY (EMERGENCY)
Facility: HOSPITAL | Age: 48
LOS: 1 days | Discharge: HOME | End: 2023-11-19
Attending: EMERGENCY MEDICINE
Payer: MEDICAID

## 2023-11-19 VITALS
TEMPERATURE: 99 F | DIASTOLIC BLOOD PRESSURE: 75 MMHG | OXYGEN SATURATION: 100 % | WEIGHT: 164.02 LBS | RESPIRATION RATE: 17 BRPM | HEART RATE: 97 BPM | HEIGHT: 60 IN | SYSTOLIC BLOOD PRESSURE: 175 MMHG

## 2023-11-19 LAB
ALBUMIN SERPL ELPH-MCNC: 4.4 G/DL — SIGNIFICANT CHANGE UP (ref 3.3–5)
ALBUMIN SERPL ELPH-MCNC: 4.4 G/DL — SIGNIFICANT CHANGE UP (ref 3.3–5)
ALP SERPL-CCNC: 84 U/L — SIGNIFICANT CHANGE UP (ref 40–120)
ALP SERPL-CCNC: 84 U/L — SIGNIFICANT CHANGE UP (ref 40–120)
ALT FLD-CCNC: 13 U/L — SIGNIFICANT CHANGE UP (ref 10–45)
ALT FLD-CCNC: 13 U/L — SIGNIFICANT CHANGE UP (ref 10–45)
ANISOCYTOSIS BLD QL: SIGNIFICANT CHANGE UP
ANISOCYTOSIS BLD QL: SIGNIFICANT CHANGE UP
APPEARANCE UR: ABNORMAL
APPEARANCE UR: ABNORMAL
APTT BLD: 30 SEC — SIGNIFICANT CHANGE UP (ref 24.5–35.6)
APTT BLD: 30 SEC — SIGNIFICANT CHANGE UP (ref 24.5–35.6)
AST SERPL-CCNC: 14 U/L — SIGNIFICANT CHANGE UP (ref 10–40)
AST SERPL-CCNC: 14 U/L — SIGNIFICANT CHANGE UP (ref 10–40)
BACTERIA # UR AUTO: ABNORMAL /HPF
BACTERIA # UR AUTO: ABNORMAL /HPF
BASOPHILS # BLD AUTO: 0 K/UL — SIGNIFICANT CHANGE UP (ref 0–0.2)
BASOPHILS # BLD AUTO: 0 K/UL — SIGNIFICANT CHANGE UP (ref 0–0.2)
BASOPHILS # BLD AUTO: 0.02 K/UL — SIGNIFICANT CHANGE UP (ref 0–0.2)
BASOPHILS # BLD AUTO: 0.02 K/UL — SIGNIFICANT CHANGE UP (ref 0–0.2)
BASOPHILS NFR BLD AUTO: 0 % — SIGNIFICANT CHANGE UP (ref 0–2)
BASOPHILS NFR BLD AUTO: 0 % — SIGNIFICANT CHANGE UP (ref 0–2)
BASOPHILS NFR BLD AUTO: 0.3 % — SIGNIFICANT CHANGE UP (ref 0–2)
BASOPHILS NFR BLD AUTO: 0.3 % — SIGNIFICANT CHANGE UP (ref 0–2)
BILIRUB SERPL-MCNC: 0.2 MG/DL — SIGNIFICANT CHANGE UP (ref 0.2–1.2)
BILIRUB SERPL-MCNC: 0.2 MG/DL — SIGNIFICANT CHANGE UP (ref 0.2–1.2)
BILIRUB UR-MCNC: ABNORMAL
BILIRUB UR-MCNC: ABNORMAL
BLD GP AB SCN SERPL QL: NEGATIVE — SIGNIFICANT CHANGE UP
BLD GP AB SCN SERPL QL: NEGATIVE — SIGNIFICANT CHANGE UP
BUN SERPL-MCNC: 13 MG/DL — SIGNIFICANT CHANGE UP (ref 7–23)
BUN SERPL-MCNC: 13 MG/DL — SIGNIFICANT CHANGE UP (ref 7–23)
CALCIUM SERPL-MCNC: 8.9 MG/DL — SIGNIFICANT CHANGE UP (ref 8.4–10.5)
CALCIUM SERPL-MCNC: 8.9 MG/DL — SIGNIFICANT CHANGE UP (ref 8.4–10.5)
CAST: 0 /LPF — SIGNIFICANT CHANGE UP (ref 0–4)
CAST: 0 /LPF — SIGNIFICANT CHANGE UP (ref 0–4)
CHLORIDE SERPL-SCNC: 106 MMOL/L — SIGNIFICANT CHANGE UP (ref 96–108)
CHLORIDE SERPL-SCNC: 106 MMOL/L — SIGNIFICANT CHANGE UP (ref 96–108)
CO2 SERPL-SCNC: 22 MMOL/L — SIGNIFICANT CHANGE UP (ref 22–31)
CO2 SERPL-SCNC: 22 MMOL/L — SIGNIFICANT CHANGE UP (ref 22–31)
COLOR SPEC: ABNORMAL
COLOR SPEC: ABNORMAL
CREAT SERPL-MCNC: 0.67 MG/DL — SIGNIFICANT CHANGE UP (ref 0.5–1.3)
CREAT SERPL-MCNC: 0.67 MG/DL — SIGNIFICANT CHANGE UP (ref 0.5–1.3)
DACRYOCYTES BLD QL SMEAR: SLIGHT — SIGNIFICANT CHANGE UP
DACRYOCYTES BLD QL SMEAR: SLIGHT — SIGNIFICANT CHANGE UP
DIFF PNL FLD: ABNORMAL
DIFF PNL FLD: ABNORMAL
EGFR: 106 ML/MIN/1.73M2 — SIGNIFICANT CHANGE UP
EGFR: 106 ML/MIN/1.73M2 — SIGNIFICANT CHANGE UP
ELLIPTOCYTES BLD QL SMEAR: SIGNIFICANT CHANGE UP
ELLIPTOCYTES BLD QL SMEAR: SIGNIFICANT CHANGE UP
EOSINOPHIL # BLD AUTO: 0 K/UL — SIGNIFICANT CHANGE UP (ref 0–0.5)
EOSINOPHIL # BLD AUTO: 0 K/UL — SIGNIFICANT CHANGE UP (ref 0–0.5)
EOSINOPHIL # BLD AUTO: 0.11 K/UL — SIGNIFICANT CHANGE UP (ref 0–0.5)
EOSINOPHIL # BLD AUTO: 0.11 K/UL — SIGNIFICANT CHANGE UP (ref 0–0.5)
EOSINOPHIL NFR BLD AUTO: 0 % — SIGNIFICANT CHANGE UP (ref 0–6)
EOSINOPHIL NFR BLD AUTO: 0 % — SIGNIFICANT CHANGE UP (ref 0–6)
EOSINOPHIL NFR BLD AUTO: 1.8 % — SIGNIFICANT CHANGE UP (ref 0–6)
EOSINOPHIL NFR BLD AUTO: 1.8 % — SIGNIFICANT CHANGE UP (ref 0–6)
GIANT PLATELETS BLD QL SMEAR: PRESENT — SIGNIFICANT CHANGE UP
GIANT PLATELETS BLD QL SMEAR: PRESENT — SIGNIFICANT CHANGE UP
GLUCOSE SERPL-MCNC: 105 MG/DL — HIGH (ref 70–99)
GLUCOSE SERPL-MCNC: 105 MG/DL — HIGH (ref 70–99)
GLUCOSE UR QL: NEGATIVE MG/DL — SIGNIFICANT CHANGE UP
GLUCOSE UR QL: NEGATIVE MG/DL — SIGNIFICANT CHANGE UP
HCG SERPL-ACNC: <2 MIU/ML — SIGNIFICANT CHANGE UP
HCG SERPL-ACNC: <2 MIU/ML — SIGNIFICANT CHANGE UP
HCT VFR BLD CALC: 27.7 % — LOW (ref 34.5–45)
HCT VFR BLD CALC: 27.7 % — LOW (ref 34.5–45)
HCT VFR BLD CALC: 28.5 % — LOW (ref 34.5–45)
HCT VFR BLD CALC: 28.5 % — LOW (ref 34.5–45)
HCT VFR BLD CALC: 32.3 % — LOW (ref 34.5–45)
HCT VFR BLD CALC: 32.3 % — LOW (ref 34.5–45)
HGB BLD-MCNC: 7.7 G/DL — LOW (ref 11.5–15.5)
HGB BLD-MCNC: 7.7 G/DL — LOW (ref 11.5–15.5)
HGB BLD-MCNC: 8.4 G/DL — LOW (ref 11.5–15.5)
HGB BLD-MCNC: 8.4 G/DL — LOW (ref 11.5–15.5)
HGB BLD-MCNC: 8.9 G/DL — LOW (ref 11.5–15.5)
HGB BLD-MCNC: 8.9 G/DL — LOW (ref 11.5–15.5)
HYPOCHROMIA BLD QL: SIGNIFICANT CHANGE UP
HYPOCHROMIA BLD QL: SIGNIFICANT CHANGE UP
IMM GRANULOCYTES NFR BLD AUTO: 0.3 % — SIGNIFICANT CHANGE UP (ref 0–0.9)
IMM GRANULOCYTES NFR BLD AUTO: 0.3 % — SIGNIFICANT CHANGE UP (ref 0–0.9)
INR BLD: 1.07 RATIO — SIGNIFICANT CHANGE UP (ref 0.85–1.18)
INR BLD: 1.07 RATIO — SIGNIFICANT CHANGE UP (ref 0.85–1.18)
IRON SATN MFR SERPL: 15 UG/DL — LOW (ref 30–160)
IRON SATN MFR SERPL: 15 UG/DL — LOW (ref 30–160)
KETONES UR-MCNC: NEGATIVE MG/DL — SIGNIFICANT CHANGE UP
KETONES UR-MCNC: NEGATIVE MG/DL — SIGNIFICANT CHANGE UP
LEUKOCYTE ESTERASE UR-ACNC: ABNORMAL
LEUKOCYTE ESTERASE UR-ACNC: ABNORMAL
LYMPHOCYTES # BLD AUTO: 1.24 K/UL — SIGNIFICANT CHANGE UP (ref 1–3.3)
LYMPHOCYTES # BLD AUTO: 1.24 K/UL — SIGNIFICANT CHANGE UP (ref 1–3.3)
LYMPHOCYTES # BLD AUTO: 1.97 K/UL — SIGNIFICANT CHANGE UP (ref 1–3.3)
LYMPHOCYTES # BLD AUTO: 1.97 K/UL — SIGNIFICANT CHANGE UP (ref 1–3.3)
LYMPHOCYTES # BLD AUTO: 21.9 % — SIGNIFICANT CHANGE UP (ref 13–44)
LYMPHOCYTES # BLD AUTO: 21.9 % — SIGNIFICANT CHANGE UP (ref 13–44)
LYMPHOCYTES # BLD AUTO: 31.9 % — SIGNIFICANT CHANGE UP (ref 13–44)
LYMPHOCYTES # BLD AUTO: 31.9 % — SIGNIFICANT CHANGE UP (ref 13–44)
MAGNESIUM SERPL-MCNC: 2.3 MG/DL — SIGNIFICANT CHANGE UP (ref 1.6–2.6)
MAGNESIUM SERPL-MCNC: 2.3 MG/DL — SIGNIFICANT CHANGE UP (ref 1.6–2.6)
MANUAL SMEAR VERIFICATION: SIGNIFICANT CHANGE UP
MANUAL SMEAR VERIFICATION: SIGNIFICANT CHANGE UP
MCHC RBC-ENTMCNC: 19.3 PG — LOW (ref 27–34)
MCHC RBC-ENTMCNC: 19.3 PG — LOW (ref 27–34)
MCHC RBC-ENTMCNC: 19.5 PG — LOW (ref 27–34)
MCHC RBC-ENTMCNC: 19.5 PG — LOW (ref 27–34)
MCHC RBC-ENTMCNC: 21.3 PG — LOW (ref 27–34)
MCHC RBC-ENTMCNC: 21.3 PG — LOW (ref 27–34)
MCHC RBC-ENTMCNC: 27.6 GM/DL — LOW (ref 32–36)
MCHC RBC-ENTMCNC: 27.6 GM/DL — LOW (ref 32–36)
MCHC RBC-ENTMCNC: 27.8 GM/DL — LOW (ref 32–36)
MCHC RBC-ENTMCNC: 27.8 GM/DL — LOW (ref 32–36)
MCHC RBC-ENTMCNC: 29.5 GM/DL — LOW (ref 32–36)
MCHC RBC-ENTMCNC: 29.5 GM/DL — LOW (ref 32–36)
MCV RBC AUTO: 70.2 FL — LOW (ref 80–100)
MCV RBC AUTO: 70.2 FL — LOW (ref 80–100)
MCV RBC AUTO: 70.3 FL — LOW (ref 80–100)
MCV RBC AUTO: 70.3 FL — LOW (ref 80–100)
MCV RBC AUTO: 72.3 FL — LOW (ref 80–100)
MCV RBC AUTO: 72.3 FL — LOW (ref 80–100)
MICROCYTES BLD QL: SIGNIFICANT CHANGE UP
MICROCYTES BLD QL: SIGNIFICANT CHANGE UP
MONOCYTES # BLD AUTO: 0.35 K/UL — SIGNIFICANT CHANGE UP (ref 0–0.9)
MONOCYTES # BLD AUTO: 0.35 K/UL — SIGNIFICANT CHANGE UP (ref 0–0.9)
MONOCYTES # BLD AUTO: 0.41 K/UL — SIGNIFICANT CHANGE UP (ref 0–0.9)
MONOCYTES # BLD AUTO: 0.41 K/UL — SIGNIFICANT CHANGE UP (ref 0–0.9)
MONOCYTES NFR BLD AUTO: 6.2 % — SIGNIFICANT CHANGE UP (ref 2–14)
MONOCYTES NFR BLD AUTO: 6.2 % — SIGNIFICANT CHANGE UP (ref 2–14)
MONOCYTES NFR BLD AUTO: 6.6 % — SIGNIFICANT CHANGE UP (ref 2–14)
MONOCYTES NFR BLD AUTO: 6.6 % — SIGNIFICANT CHANGE UP (ref 2–14)
NEUTROPHILS # BLD AUTO: 3.65 K/UL — SIGNIFICANT CHANGE UP (ref 1.8–7.4)
NEUTROPHILS # BLD AUTO: 3.65 K/UL — SIGNIFICANT CHANGE UP (ref 1.8–7.4)
NEUTROPHILS # BLD AUTO: 4.08 K/UL — SIGNIFICANT CHANGE UP (ref 1.8–7.4)
NEUTROPHILS # BLD AUTO: 4.08 K/UL — SIGNIFICANT CHANGE UP (ref 1.8–7.4)
NEUTROPHILS NFR BLD AUTO: 59.1 % — SIGNIFICANT CHANGE UP (ref 43–77)
NEUTROPHILS NFR BLD AUTO: 59.1 % — SIGNIFICANT CHANGE UP (ref 43–77)
NEUTROPHILS NFR BLD AUTO: 71.9 % — SIGNIFICANT CHANGE UP (ref 43–77)
NEUTROPHILS NFR BLD AUTO: 71.9 % — SIGNIFICANT CHANGE UP (ref 43–77)
NITRITE UR-MCNC: POSITIVE
NITRITE UR-MCNC: POSITIVE
NRBC # BLD: 0 /100 WBCS — SIGNIFICANT CHANGE UP (ref 0–0)
PH UR: 5.5 — SIGNIFICANT CHANGE UP (ref 5–8)
PH UR: 5.5 — SIGNIFICANT CHANGE UP (ref 5–8)
PLAT MORPH BLD: ABNORMAL
PLAT MORPH BLD: ABNORMAL
PLATELET # BLD AUTO: 327 K/UL — SIGNIFICANT CHANGE UP (ref 150–400)
PLATELET # BLD AUTO: 327 K/UL — SIGNIFICANT CHANGE UP (ref 150–400)
PLATELET # BLD AUTO: 366 K/UL — SIGNIFICANT CHANGE UP (ref 150–400)
PLATELET # BLD AUTO: 366 K/UL — SIGNIFICANT CHANGE UP (ref 150–400)
PLATELET # BLD AUTO: 430 K/UL — HIGH (ref 150–400)
PLATELET # BLD AUTO: 430 K/UL — HIGH (ref 150–400)
POIKILOCYTOSIS BLD QL AUTO: SIGNIFICANT CHANGE UP
POIKILOCYTOSIS BLD QL AUTO: SIGNIFICANT CHANGE UP
POLYCHROMASIA BLD QL SMEAR: SIGNIFICANT CHANGE UP
POLYCHROMASIA BLD QL SMEAR: SIGNIFICANT CHANGE UP
POTASSIUM SERPL-MCNC: 3.9 MMOL/L — SIGNIFICANT CHANGE UP (ref 3.5–5.3)
POTASSIUM SERPL-MCNC: 3.9 MMOL/L — SIGNIFICANT CHANGE UP (ref 3.5–5.3)
POTASSIUM SERPL-SCNC: 3.9 MMOL/L — SIGNIFICANT CHANGE UP (ref 3.5–5.3)
POTASSIUM SERPL-SCNC: 3.9 MMOL/L — SIGNIFICANT CHANGE UP (ref 3.5–5.3)
PROT SERPL-MCNC: 7.5 G/DL — SIGNIFICANT CHANGE UP (ref 6–8.3)
PROT SERPL-MCNC: 7.5 G/DL — SIGNIFICANT CHANGE UP (ref 6–8.3)
PROT UR-MCNC: 100 MG/DL
PROT UR-MCNC: 100 MG/DL
PROTHROM AB SERPL-ACNC: 11.7 SEC — SIGNIFICANT CHANGE UP (ref 9.5–13)
PROTHROM AB SERPL-ACNC: 11.7 SEC — SIGNIFICANT CHANGE UP (ref 9.5–13)
RBC # BLD: 3.94 M/UL — SIGNIFICANT CHANGE UP (ref 3.8–5.2)
RBC # BLD: 4.6 M/UL — SIGNIFICANT CHANGE UP (ref 3.8–5.2)
RBC # BLD: 4.6 M/UL — SIGNIFICANT CHANGE UP (ref 3.8–5.2)
RBC # FLD: 25.1 % — HIGH (ref 10.3–14.5)
RBC # FLD: 25.1 % — HIGH (ref 10.3–14.5)
RBC # FLD: 26.3 % — HIGH (ref 10.3–14.5)
RBC # FLD: 26.3 % — HIGH (ref 10.3–14.5)
RBC # FLD: 26.5 % — HIGH (ref 10.3–14.5)
RBC # FLD: 26.5 % — HIGH (ref 10.3–14.5)
RBC BLD AUTO: ABNORMAL
RBC BLD AUTO: ABNORMAL
RBC CASTS # UR COMP ASSIST: >1900 /HPF — HIGH (ref 0–4)
RBC CASTS # UR COMP ASSIST: >1900 /HPF — HIGH (ref 0–4)
REVIEW: SIGNIFICANT CHANGE UP
REVIEW: SIGNIFICANT CHANGE UP
RH IG SCN BLD-IMP: POSITIVE — SIGNIFICANT CHANGE UP
RH IG SCN BLD-IMP: POSITIVE — SIGNIFICANT CHANGE UP
SODIUM SERPL-SCNC: 140 MMOL/L — SIGNIFICANT CHANGE UP (ref 135–145)
SODIUM SERPL-SCNC: 140 MMOL/L — SIGNIFICANT CHANGE UP (ref 135–145)
SP GR SPEC: >=1.099 (ref 1–1.03)
SP GR SPEC: >=1.099 (ref 1–1.03)
SQUAMOUS # UR AUTO: 2 /HPF — SIGNIFICANT CHANGE UP (ref 0–5)
SQUAMOUS # UR AUTO: 2 /HPF — SIGNIFICANT CHANGE UP (ref 0–5)
UROBILINOGEN FLD QL: 0.2 MG/DL — SIGNIFICANT CHANGE UP (ref 0.2–1)
UROBILINOGEN FLD QL: 0.2 MG/DL — SIGNIFICANT CHANGE UP (ref 0.2–1)
WBC # BLD: 5.14 K/UL — SIGNIFICANT CHANGE UP (ref 3.8–10.5)
WBC # BLD: 5.14 K/UL — SIGNIFICANT CHANGE UP (ref 3.8–10.5)
WBC # BLD: 5.68 K/UL — SIGNIFICANT CHANGE UP (ref 3.8–10.5)
WBC # BLD: 5.68 K/UL — SIGNIFICANT CHANGE UP (ref 3.8–10.5)
WBC # BLD: 6.18 K/UL — SIGNIFICANT CHANGE UP (ref 3.8–10.5)
WBC # BLD: 6.18 K/UL — SIGNIFICANT CHANGE UP (ref 3.8–10.5)
WBC # FLD AUTO: 5.14 K/UL — SIGNIFICANT CHANGE UP (ref 3.8–10.5)
WBC # FLD AUTO: 5.14 K/UL — SIGNIFICANT CHANGE UP (ref 3.8–10.5)
WBC # FLD AUTO: 5.68 K/UL — SIGNIFICANT CHANGE UP (ref 3.8–10.5)
WBC # FLD AUTO: 5.68 K/UL — SIGNIFICANT CHANGE UP (ref 3.8–10.5)
WBC # FLD AUTO: 6.18 K/UL — SIGNIFICANT CHANGE UP (ref 3.8–10.5)
WBC # FLD AUTO: 6.18 K/UL — SIGNIFICANT CHANGE UP (ref 3.8–10.5)
WBC UR QL: 36 /HPF — HIGH (ref 0–5)
WBC UR QL: 36 /HPF — HIGH (ref 0–5)

## 2023-11-19 PROCEDURE — 76830 TRANSVAGINAL US NON-OB: CPT | Mod: 26

## 2023-11-19 PROCEDURE — 76705 ECHO EXAM OF ABDOMEN: CPT | Mod: 26

## 2023-11-19 PROCEDURE — G1004: CPT

## 2023-11-19 PROCEDURE — 76856 US EXAM PELVIC COMPLETE: CPT | Mod: 26,59

## 2023-11-19 PROCEDURE — 99223 1ST HOSP IP/OBS HIGH 75: CPT

## 2023-11-19 PROCEDURE — 93975 VASCULAR STUDY: CPT | Mod: 26

## 2023-11-19 PROCEDURE — 74177 CT ABD & PELVIS W/CONTRAST: CPT | Mod: 26,MG

## 2023-11-19 RX ORDER — SODIUM CHLORIDE 9 MG/ML
1000 INJECTION INTRAMUSCULAR; INTRAVENOUS; SUBCUTANEOUS ONCE
Refills: 0 | Status: COMPLETED | OUTPATIENT
Start: 2023-11-19 | End: 2023-11-19

## 2023-11-19 RX ORDER — TRANEXAMIC ACID 100 MG/ML
1300 INJECTION, SOLUTION INTRAVENOUS EVERY 8 HOURS
Refills: 0 | Status: DISCONTINUED | OUTPATIENT
Start: 2023-11-19 | End: 2023-11-19

## 2023-11-19 RX ORDER — ACETAMINOPHEN 500 MG
975 TABLET ORAL ONCE
Refills: 0 | Status: COMPLETED | OUTPATIENT
Start: 2023-11-19 | End: 2023-11-19

## 2023-11-19 RX ORDER — CEFTRIAXONE 500 MG/1
1000 INJECTION, POWDER, FOR SOLUTION INTRAMUSCULAR; INTRAVENOUS ONCE
Refills: 0 | Status: COMPLETED | OUTPATIENT
Start: 2023-11-19 | End: 2023-11-19

## 2023-11-19 RX ORDER — TRANEXAMIC ACID 100 MG/ML
1300 INJECTION, SOLUTION INTRAVENOUS THREE TIMES A DAY
Refills: 0 | Status: DISCONTINUED | OUTPATIENT
Start: 2023-11-19 | End: 2023-11-22

## 2023-11-19 RX ORDER — KETOROLAC TROMETHAMINE 30 MG/ML
15 SYRINGE (ML) INJECTION ONCE
Refills: 0 | Status: DISCONTINUED | OUTPATIENT
Start: 2023-11-19 | End: 2023-11-19

## 2023-11-19 RX ADMIN — CEFTRIAXONE 100 MILLIGRAM(S): 500 INJECTION, POWDER, FOR SOLUTION INTRAMUSCULAR; INTRAVENOUS at 18:35

## 2023-11-19 RX ADMIN — SODIUM CHLORIDE 1000 MILLILITER(S): 9 INJECTION INTRAMUSCULAR; INTRAVENOUS; SUBCUTANEOUS at 14:48

## 2023-11-19 RX ADMIN — Medication 975 MILLIGRAM(S): at 08:38

## 2023-11-19 RX ADMIN — Medication 15 MILLIGRAM(S): at 20:06

## 2023-11-19 RX ADMIN — TRANEXAMIC ACID 1300 MILLIGRAM(S): 100 INJECTION, SOLUTION INTRAVENOUS at 15:15

## 2023-11-19 RX ADMIN — TRANEXAMIC ACID 1300 MILLIGRAM(S): 100 INJECTION, SOLUTION INTRAVENOUS at 23:26

## 2023-11-19 RX ADMIN — Medication 15 MILLIGRAM(S): at 20:21

## 2023-11-19 NOTE — ED CDU PROVIDER DISPOSITION NOTE - CLINICAL COURSE
48y  LMP  presents with heavy vaginal bleeding.  Patient reports she has a longstanding history of heavy menses, and irregular abnormal bleeding for the past 8 years.  She was previously seen in the ED in October for the same complaint, received 2uPRBC during that visit, and was prescribed Aygestin 5mg BID x 14 days.  Reports her bleeding stopped after taking the Aygestin, but she began bleeding again on .  She saw her outpatient OBGYN on Friday, who administered Depo Provera, and provided a referral for gyn surgery for surgical consultation for management of abnormal uterine bleeding.  Reports that on Friday and Saturday, she had moderate bleeding, but last night bleeding became heavy and she soaked an overnight menstrual pad every 30 min.  Also endorses lightheadedness, dizziness, nausea, palpitations, shortness of breath.  Additionally reports abdominal cramping, minimally improved with Tylenol and Excedrin at home.  Reports bleeding has decreased while in the ED, and now denies lightheadedness, dizziness, palpitations, shortness of breath.  States that cramping has improved with Tylenol in ED.    Hemoglobin 7.7 given 1 unit of PRBCs as pt is symptomatic. sent to cdu for further bleeding monitoring and prbcs 48y  LMP  presents with heavy vaginal bleeding.  Patient reports she has a longstanding history of heavy menses, and irregular abnormal bleeding for the past 8 years.  She was previously seen in the ED in October for the same complaint, received 2uPRBC during that visit, and was prescribed Aygestin 5mg BID x 14 days.  Reports her bleeding stopped after taking the Aygestin, but she began bleeding again on .  She saw her outpatient OBGYN on Friday, who administered Depo Provera, and provided a referral for gyn surgery for surgical consultation for management of abnormal uterine bleeding.  Reports that on Friday and Saturday, she had moderate bleeding, but last night bleeding became heavy and she soaked an overnight menstrual pad every 30 min.  Also endorses lightheadedness, dizziness, nausea, palpitations, shortness of breath.  Additionally reports abdominal cramping, minimally improved with Tylenol and Excedrin at home.  Reports bleeding has decreased while in the ED, and now denies lightheadedness, dizziness, palpitations, shortness of breath.  States that cramping has improved with Tylenol in ED.  Hemoglobin 7.7 given 1 unit of PRBCs as pt is symptomatic. sent to cdu for further bleeding monitoring and prbcs  In the CDU pt with stable VS. Repeat hbg with appropriate response after transfusion. Reports vaginal bleeding sig improved. Pt was reassessed by GYN who recommends pt start Lysteda 1300mg TID x5 days (two 650mg tablets three times a day, total daily dose 3900mg/day). They are aware pt just completed course of Agestin and received depot shot this week by her GYN. GYN still agrees with plan to start Lysteda. Pt agreeable as well. Will send rx for UTI also. Patient stable for discharge.

## 2023-11-19 NOTE — ED PROVIDER NOTE - CLINICAL SUMMARY MEDICAL DECISION MAKING FREE TEXT BOX
49 y/o female w/o PMH c/o 3 day history of heavy vaginal bleeding and left pelvic pain. Pt saw her OBGYN MD, received a DEPO shot, but is still bleeding. Otherwise asymptomatic. Denies fevers, chills, nausea, vomiting, dizziness, chest pain, SOB, abdominal pain, dysuria, hematuria. Vaginal exam shows moderate bleeding and left adnexal TTP. LLQ abd TTP. Will obtain labs, coags, T&S, CTAP for intraabdominal pathology, TVUS for vag bleeding and left adnexal TTP, and reassess w/ blood transfusions PRN depending on HGB. Pt has been consented for blood, witnessed by Josefa Mendez RN. 49 y/o female w/o PMH c/o 3 day history of heavy vaginal bleeding and left pelvic pain. Pt saw her OBGYN MD, received a DEPO shot, but is still bleeding. Otherwise asymptomatic. Denies fevers, chills, nausea, vomiting, dizziness, chest pain, SOB, abdominal pain, dysuria, hematuria. Vaginal exam shows moderate bleeding and left adnexal TTP. LLQ abd TTP. Will obtain labs, coags, T&S, CTAP for intraabdominal pathology, TVUS for vag bleeding and left adnexal TTP, and reassess w/ blood transfusions PRN depending on HGB. Pt has been consented for blood, witnessed by Josefa Mendez RN.  Attending Viji Calvo: 47 yo female h/o vaginal bleeding presenting iwtih abdominal pain and vaginal bleeding. upon arrival pt hemodynamically stable. on pelvic exam performed by resident pt with small aount of bleeding. no known h/o std or vaginal discharge. pt did have adnexal pain. concern for DUB, vs diverticulitis, vs ovarian cyst. pt has required blood transfusion in the past. will obtain labs, monitor bleeding, tvus and consider ct of the abdomen to further evalvuate

## 2023-11-19 NOTE — ED CDU PROVIDER INITIAL DAY NOTE - OBJECTIVE STATEMENT
48y  LMP  presents with heavy vaginal bleeding.  Patient reports she has a longstanding history of heavy menses, and irregular abnormal bleeding for the past 8 years.  She was previously seen in the ED in October for the same complaint, received 2uPRBC during that visit, and was prescribed Aygestin 5mg BID x 14 days.  Reports her bleeding stopped after taking the Aygestin, but she began bleeding again on .  She saw her outpatient OBGYN on Friday, who administered Depo Provera, and provided a referral for gyn surgery for surgical consultation for management of abnormal uterine bleeding.  Reports that on Friday and Saturday, she had moderate bleeding, but last night bleeding became heavy and she soaked an overnight menstrual pad every 30 min.  Also endorses lightheadedness, dizziness, nausea, palpitations, shortness of breath.  Additionally reports abdominal cramping, minimally improved with Tylenol and Excedrin at home.  Reports bleeding has decreased while in the ED, and now denies lightheadedness, dizziness, palpitations, shortness of breath.  States that cramping has improved with Tylenol in ED.

## 2023-11-19 NOTE — ED CDU PROVIDER INITIAL DAY NOTE - CLINICAL SUMMARY MEDICAL DECISION MAKING FREE TEXT BOX
Attending Viji Calvo: 47 yo female presenting with vaginal bleeding. upon arriva pt hemodynamically stable. ho requiring transfusion. seen by gyn, placed in cdu for monitoring and prbc

## 2023-11-19 NOTE — ED CDU PROVIDER INITIAL DAY NOTE - DETAILS
48y  LMP  presents with heavy vaginal bleeding.   *SYMPTOMATIC ANEMIA  1 unit prbc f/u post transfusion cbc  TXA per GYN recs  monitor bleeding   discussed with Dr. Calvo

## 2023-11-19 NOTE — ED ADULT TRIAGE NOTE - CHIEF COMPLAINT QUOTE
abdominal pain and vaginal bleeding since friday. Given depo shot by OBGYN to stop bleeding, but not working  need blood transfusion on 10/31, states she is going through 12 pads/day

## 2023-11-19 NOTE — ED PROVIDER NOTE - OBJECTIVE STATEMENT
47 y/o female w/o PMH c/o 3 day history of heavy vaginal bleeding and left pelvic pain. Pt saw her OBGYN MD, received a DEPO shot, but is still bleeding. Otherwise asymptomatic. Denies fevers, chills, nausea, vomiting, dizziness, chest pain, SOB, abdominal pain, dysuria, hematuria.

## 2023-11-19 NOTE — CONSULT NOTE ADULT - ASSESSMENT
48y  LMP  presents with heavy vaginal bleeding.  VSS.  Abdominal exam with mild lower abdominal tenderness.  Pelvic exam notable for enlarged uterus with blood and clot in vaginal vault without active bleeding.  H/H noted to have dropped from 8.9/32.3 to 7.7/27.7 while in ED.  Imaging notable for enlarged uterus, possibly consistent with adenomyosis and a small anterior subserosal fibroid.  Findings likely correlated with adenomyosis as suggested on imaging vs abnormal bleeding related to perimenopausal state.    Recommendations:  - Recommend IV fluid, given patient has not had PO intake since yesterday with vaginal bleeding  - Can prescribe Lysteda 1300mg TID x5 days (two 650mg tablets three times a day, total daily dose 3900mg/day)  - Recommend follow up outpatient with primary OBGYN, or with gyn surgeon as referred by her outpatient OBGYN  - Remainder of care per ED     d/w Dr. Samayoa, Service Attending  MEHUL Sims PGY2

## 2023-11-19 NOTE — ED CDU PROVIDER INITIAL DAY NOTE - PROGRESS NOTE DETAILS
CDU PROGRESS NOTE DONIS NIEVES: Received pt at 1900 sign-out. Case/plan reviewed. VSS. Pt completed 1 unit PRBC, Pt resting in stretcher in NAD. On exam, S1 S2 noted, RRR, lungs CTA b/l, BS x4 with soft, nontender abdomen. Pt without complaints. Will continue to monitor, f/u repeat H/H. CDU PROGRESS NOTE DONIS NIEVES: Pt resting comfortably, feeling well and reports bleeding has decreased. Repeat H/H post 1 unit PRBC from 7.7/27.7 to 8.4/28.5. Pt declines analgesia now.

## 2023-11-19 NOTE — ED CDU PROVIDER DISPOSITION NOTE - ATTENDING APP SHARED VISIT CONTRIBUTION OF CARE
Patient is reassessed, states feeling much better at this time. Discussed discharge plan, follow up, medications and return precuations.

## 2023-11-19 NOTE — ED CDU PROVIDER DISPOSITION NOTE - NSFOLLOWUPINSTRUCTIONS_ED_ALL_ED_FT
Follow up with your Primary Care Physician within the next 2-3 days  Bring a copy of your test results with you to your appointment      AS PER GYN    Return to the Emergency Room if you experience new or worsening symptoms abdominal pain, nausea, vomiting, fever chills, cough, chest pain, shortness of breath, dizziness, slurred speech, weakness, gait abnormality Please make sure to follow up with your primary care doctor within 1-2 days and with your GYNECOLOGY specialist. Bring a copy of all of your results with you to your follow up appointments.   Return to the ER as discussed if you develop any new or worsening symptoms.     Take the medication as prescribed. Two medications were sent to your pharmacy for vaginal bleeding and a urinary tract infection. Take both as prescribed. Make sure to complete the course as directed.

## 2023-11-19 NOTE — ED CDU PROVIDER DISPOSITION NOTE - PATIENT PORTAL LINK FT
You can access the FollowMyHealth Patient Portal offered by [facility] by registering at the following website: [email or web address]. By joining [employer]’s FollowMyHealth portal, you will also be able to view your health information using other applications (apps) compatible with our system.

## 2023-11-19 NOTE — ED PROVIDER NOTE - NSFOLLOWUPINSTRUCTIONS_ED_ALL_ED_FT
We evaluated you for vaginal bleeding today. We checked your hemoglobin and you do not need a blood transfusion at this time. Your situation can change quickly. If you develop bleeding that you are soaking through more than 2 pads per hour for 2 hours, if you develop lightheadedness/dizziness/feeling like you will pass out, chest pain, shortness of breath please return to the emergency room. We recommend that you make an appointment with your obstetrician-gynecologist for repeat evaluation and to ensure the symptoms are improving.    The results of any blood tests and imaging studies completed during your visit today were discussed and explained to you and a copy provided with your discharge instructions. Please follow up with your primary care doctor and OBGYN doctor within 48 hours.

## 2023-11-19 NOTE — ED CDU PROVIDER INITIAL DAY NOTE - ATTENDING APP SHARED VISIT CONTRIBUTION OF CARE
Attending MD Calvo:  I have personally performed a face to face diagnostic evaluation on this patient.  I have reviewed the ACP note and agree with the history, exam, and plan of care, except as noted.

## 2023-11-19 NOTE — ED CDU PROVIDER INITIAL DAY NOTE - PHYSICAL EXAMINATION
GENERAL: NAD  HEENT:  Atraumatic  CHEST/LUNG: Chest rise equal bilaterally  HEART: Regular rate and rhythm  ABDOMEN: Soft, LLQ abd TTP, Nondistended. No CVA TTP  EXTREMITIES:  Extremities warm  PSYCH: A&Ox3  SKIN: No obvious rashes or lesions  NEUROLOGY: strength and sensation intact in all extremities. Ambulatory without difficulty.   PELVIC: per primary teams

## 2023-11-19 NOTE — CONSULT NOTE ADULT - PROVIDER SPECIALTY LIST ADULT
GYN Bactrim Pregnancy And Lactation Text: This medication is Pregnancy Category D and is known to cause fetal risk.  It is also excreted in breast milk.

## 2023-11-19 NOTE — ED PROVIDER NOTE - PHYSICAL EXAMINATION
GENERAL: NAD  HEENT:  Atraumatic  CHEST/LUNG: Chest rise equal bilaterally  HEART: Regular rate and rhythm  ABDOMEN: Soft, LLQ abd TTP, Nondistended. No CVA TTP  EXTREMITIES:  Extremities warm  PSYCH: A&Ox3  SKIN: No obvious rashes or lesions  NEUROLOGY: strength and sensation intact in all extremities. Ambulatory without difficulty.   PELVIC: Chaperone Josefa Mendez RN. No bleeding on pad. External labia wnl. Speculum exam with no vaginal lesions, moderate amount of blood visualized. Bimanual exam with no cervical motion tenderness, left adnexal TTP GENERAL: NAD  HEENT:  Atraumatic  CHEST/LUNG: Chest rise equal bilaterally  HEART: Regular rate and rhythm  ABDOMEN: Soft, LLQ abd TTP, Nondistended. No CVA TTP  EXTREMITIES:  Extremities warm  PSYCH: A&Ox3  SKIN: No obvious rashes or lesions  NEUROLOGY: strength and sensation intact in all extremities. Ambulatory without difficulty.   PELVIC: Chaperone Josefa Mendez RN. No bleeding on pad. External labia wnl. Speculum exam with no vaginal lesions, moderate amount of blood visualized. Bimanual exam with no cervical motion tenderness, left adnexal TTP  Attending Viji Calvo: Gen: NAD, heent: atrauamtic, , mmm,neck; nttp, no nuchal rigidity, chest: nttp, no crepitus, cv: rrr, no murmurs, lungs: ctab, abd: soft, ttp LLQ no guarding, ext: wwp, neg homans, skin: no rash, neuro: awake and alert, following commands, speech clear, sensation and strength intact, no focal deficits

## 2023-11-19 NOTE — ED ADULT NURSE NOTE - NSFALLHARMRISKINTERV_ED_ALL_ED

## 2023-11-19 NOTE — CONSULT NOTE ADULT - SUBJECTIVE AND OBJECTIVE BOX
GYN Consult Note    48y  LMP  presents with heavy vaginal bleeding.  Patient reports she has a longstanding history of heavy menses, and irregular abnormal bleeding for the past 8 years.  She was previously seen in the ED in October for the same complaint, received 2uPRBC during that visit, and was prescribed Aygestin 5mg BID x 14 days.  Reports her bleeding stopped after taking the Aygestin, but she began bleeding again on .  She saw her outpatient OBGYN on Friday, who administered Depo Provera, and provided a referral for gyn surgery for surgical consultation for management of abnormal uterine bleeding.  Reports that on Friday and Saturday, she had moderate bleeding, but last night bleeding became heavy and she soaked an overnight menstrual pad every 30 min.  Also endorses lightheadedness, dizziness, nausea, palpitations, shortness of breath.  Additionally reports abdominal cramping, minimally improved with Tylenol and Excedrin at home.  Reports bleeding has decreased while in the ED, and now denies lightheadedness, dizziness, palpitations, shortness of breath.  States that cramping has improved with Tylenol in ED.     OB/GYN HISTORY:   Physician: Dr. Maris Weathers at [facility]  Ob:  x4 (ages 25, 24, 21, 17)  Gyn: Endorses history of "small fibroid"; Denies cysts, PCOS, endometriosis, STIs, abnormal pap smears  PMH: Anemia   PSH: Denies  Meds: Iron, Vit D, Multivitamin  Allergies: NKDA      Vital Signs Last 24 Hrs  T(C): 37.1 (2023 07:58), Max: 37.1 (2023 07:58)  T(F): 98.8 (2023 07:58), Max: 98.8 (2023 07:58)  HR: 97 (2023 07:58) (97 - 97)  BP: 175/75 (2023 07:58) (175/75 - 175/75)  BP(mean): --  RR: 17 (2023 07:58) (17 - 17)  SpO2: 100% (2023 07:58) (100% - 100%)    Parameters below as of 2023 07:58  Patient On (Oxygen Delivery Method): room air      PHYSICAL EXAM:   Gen: NAD, alert and oriented x 3  Cardiovascular: RRR  Respiratory: breathing comfortably on RA  Abd: soft, mildly tender to palpation in lower abdomen, non-distended, no rebound/guarding  Pelvic: closed/long, no CMT, Uterus: enlarged 12w size, mildly tender  Adnexa: non tender, no palpable masses  Speculum Exam: Small amount of blood and clot in vault, no active bleeding from os.    Extremities: non-tender b/l, no edema   Skin: warm and well perfused  *Pelvic exam performed with chaperone present: MIREYA Rivero    LABS:                        7.7    5.14  )-----------( 366      ( 2023 11:27 )             27.7         140  |  106  |  13  ----------------------------<  105<H>  3.9   |  22  |  0.67    Ca    8.9      2023 08:48  Mg     2.3         TPro  7.5  /  Alb  4.4  /  TBili  0.2  /  DBili  x   /  AST  14  /  ALT  13  /  AlkPhos  84      PT/INR - ( 2023 08:48 )   PT: 11.7 sec;   INR: 1.07 ratio         PTT - ( 2023 08:48 )  PTT:30.0 sec  Urinalysis Basic - ( 2023 12:02 )    Color: Red / Appearance: Cloudy / SG: >=1.099 / pH: x  Gluc: x / Ketone: Negative mg/dL  / Bili: Small / Urobili: 0.2 mg/dL   Blood: x / Protein: 100 mg/dL / Nitrite: Positive   Leuk Esterase: Small / RBC: >1900 /HPF / WBC 36 /HPF   Sq Epi: x / Non Sq Epi: 2 /HPF / Bacteria: Few /HPF    HCG <2    RADIOLOGY & ADDITIONAL STUDIES:    ACC: [id number] EXAM:  CT ABDOMEN AND PELVIS IC   ORDERED BY: MAR SAGE     PROCEDURE DATE:  2023          INTERPRETATION:  CLINICAL INFORMATION: Left lower quadrant pain    COMPARISON: None.    CONTRAST/COMPLICATIONS:  IV Contrast: Omnipaque 350  90 cc administered   10 cc discarded  Oral Contrast: NONE  Complications: None reported at time of study completion    PROCEDURE:  CT of the Abdomen and Pelvis was performed.  Sagittal and coronal reformats were performed.    FINDINGS:  LOWER CHEST: Within normal limits.    LIVER: Hypoattenuating foci too small to characterize.  BILE DUCTS: Normal caliber.  GALLBLADDER: Within normal limits.  SPLEEN: Within normal limits.  PANCREAS: Within normal limits.  ADRENALS: 8 mm indeterminate left adrenal gland nodule. Right adrenal   gland within normal limits.  KIDNEYS/URETERS: Symmetric contrast uptake bilaterally. No   hydronephrosis. Mild right renal cortical scarring.    BLADDER: Within normal limits.  REPRODUCTIVE ORGANS: Uterus is mildly enlarged and heterogeneous. Adnexa   within normal limits.    BOWEL: Small periampullary duodenal diverticulum. No bowel obstruction.   Appendix is normal.  PERITONEUM: No ascites.  VESSELS: Within normal limits.  RETROPERITONEUM/LYMPH NODES: No lymphadenopathy.  ABDOMINAL WALL: Within normal limits.  BONES: Mild degenerative changes.    IMPRESSION:    No CT evidence of acute pathology in the abdomen or pelvis.    Uterus is mildly enlarged and heterogeneous. Correlate with pelvic   ultrasound if clinically warranted.    --- End of Report ---           JOSE ANTONIO DAVALOS MD; Resident Radiologist  This document has been electronically signed.  MARIELY OCHOA MD; Attending Radiologist  This document has been electronically signed. 2023 10:36AM      ACC: [id number] EXAM:  US PELVIC COMPLETE   ORDERED BY: MAR SAGE     ACC: [id number] EXAM:  US DPLX PELVIC   ORDERED BY: MAR SAGE     ACC: [id number] EXAM:  US TRANSVAGINAL   ORDERED BY: MAR SAGE     PROCEDURE DATE:  2023          INTERPRETATION:  CLINICAL INFORMATION: Heavy menstrual bleeding. Left   lower quadrant pain.    LMP: 2023    COMPARISON: Pelvic ultrasound 2023, CT abdomen and pelvis 2023    TECHNIQUE:  Endovaginal and transabdominal pelvic sonogram. Color and Spectral   Doppler was performed.    FINDINGS:  Uterus: Enlarged heterogeneous uterus measuring 10.3 cm x 6.0 cm x 6.8 cm   with Venetian blind appearance, suspicious for adenomyosis. Anterior   subserosal fibroid measuring 1.4 x 0.9 x 1.6 cm.  Endometrium: 15 mm. Within normal limits.    Right ovary: 2.8 cm x 1.7 cm x 2.4 cm. Within normal limits. Normal   arterial and venous waveforms.  Left ovary: Slightly limited evaluation due to position of the ovary   behind the uterus. The size of the left ovary is within normal limits,   measuring 2.7 cm x 2.0 cm x 2.3 cm. Normal arterial and venous waveforms   seen on transabdominal imaging.    Fluid: None.    IMPRESSION:    Slightly limited evaluation of the left ovary due to position of the   ovary behind the uterus. No ovarian enlargement or peripheralization of   follicles to suggest ovarian torsion at this time. There is normal   arterial and venous waveforms seen on transabdominal imaging.    Enlarged heterogeneous uterus suggesting adenomyosis. Anterior subserosal   fibroid.    --- End of Report ---           VALDEMAR DOBBINS MD; Resident Radiologist  This document has been electronically signed.  DONOVAN IGNACIO MD; Attending Radiologist  This document has been electronically signed. 2023 11:33AM

## 2023-11-19 NOTE — ED PROVIDER NOTE - PROGRESS NOTE DETAILS
Demetrio COLVIN: Repeat HGB on CBC is downtrending. OBGYN consulted, no acute sx interventions at this time, will hold patient in CDU for serial CBC trending and OB recs.

## 2023-11-19 NOTE — ED PROVIDER NOTE - ATTENDING CONTRIBUTION TO CARE
Attending MD Viji Calvo:  I personally have seen and examined this patient.  Resident note reviewed and agree on plan of care and except where noted.  See HPI, PE, and MDM for details.

## 2023-11-20 VITALS
TEMPERATURE: 98 F | RESPIRATION RATE: 18 BRPM | SYSTOLIC BLOOD PRESSURE: 116 MMHG | HEART RATE: 80 BPM | OXYGEN SATURATION: 100 % | DIASTOLIC BLOOD PRESSURE: 59 MMHG

## 2023-11-20 LAB
C TRACH RRNA SPEC QL NAA+PROBE: SIGNIFICANT CHANGE UP
C TRACH RRNA SPEC QL NAA+PROBE: SIGNIFICANT CHANGE UP
N GONORRHOEA RRNA SPEC QL NAA+PROBE: SIGNIFICANT CHANGE UP
N GONORRHOEA RRNA SPEC QL NAA+PROBE: SIGNIFICANT CHANGE UP
SPECIMEN SOURCE: SIGNIFICANT CHANGE UP
SPECIMEN SOURCE: SIGNIFICANT CHANGE UP

## 2023-11-20 PROCEDURE — 36430 TRANSFUSION BLD/BLD COMPNT: CPT

## 2023-11-20 PROCEDURE — 83540 ASSAY OF IRON: CPT

## 2023-11-20 PROCEDURE — 76705 ECHO EXAM OF ABDOMEN: CPT

## 2023-11-20 PROCEDURE — 87491 CHLMYD TRACH DNA AMP PROBE: CPT

## 2023-11-20 PROCEDURE — 76830 TRANSVAGINAL US NON-OB: CPT

## 2023-11-20 PROCEDURE — G0378: CPT

## 2023-11-20 PROCEDURE — 76856 US EXAM PELVIC COMPLETE: CPT

## 2023-11-20 PROCEDURE — 86923 COMPATIBILITY TEST ELECTRIC: CPT

## 2023-11-20 PROCEDURE — 85730 THROMBOPLASTIN TIME PARTIAL: CPT

## 2023-11-20 PROCEDURE — 81001 URINALYSIS AUTO W/SCOPE: CPT

## 2023-11-20 PROCEDURE — 96374 THER/PROPH/DIAG INJ IV PUSH: CPT | Mod: XU

## 2023-11-20 PROCEDURE — 85025 COMPLETE CBC W/AUTO DIFF WBC: CPT

## 2023-11-20 PROCEDURE — 87086 URINE CULTURE/COLONY COUNT: CPT

## 2023-11-20 PROCEDURE — G1004: CPT

## 2023-11-20 PROCEDURE — 86900 BLOOD TYPING SEROLOGIC ABO: CPT

## 2023-11-20 PROCEDURE — P9016: CPT

## 2023-11-20 PROCEDURE — 93975 VASCULAR STUDY: CPT

## 2023-11-20 PROCEDURE — 84702 CHORIONIC GONADOTROPIN TEST: CPT

## 2023-11-20 PROCEDURE — 74177 CT ABD & PELVIS W/CONTRAST: CPT | Mod: MG

## 2023-11-20 PROCEDURE — 86850 RBC ANTIBODY SCREEN: CPT

## 2023-11-20 PROCEDURE — 99239 HOSP IP/OBS DSCHRG MGMT >30: CPT

## 2023-11-20 PROCEDURE — 87591 N.GONORRHOEAE DNA AMP PROB: CPT

## 2023-11-20 PROCEDURE — 83615 LACTATE (LD) (LDH) ENZYME: CPT

## 2023-11-20 PROCEDURE — 85610 PROTHROMBIN TIME: CPT

## 2023-11-20 PROCEDURE — 86901 BLOOD TYPING SEROLOGIC RH(D): CPT

## 2023-11-20 PROCEDURE — 36415 COLL VENOUS BLD VENIPUNCTURE: CPT

## 2023-11-20 PROCEDURE — 83735 ASSAY OF MAGNESIUM: CPT

## 2023-11-20 PROCEDURE — 96375 TX/PRO/DX INJ NEW DRUG ADDON: CPT

## 2023-11-20 PROCEDURE — 80053 COMPREHEN METABOLIC PANEL: CPT

## 2023-11-20 PROCEDURE — 83010 ASSAY OF HAPTOGLOBIN QUANT: CPT

## 2023-11-20 PROCEDURE — 99284 EMERGENCY DEPT VISIT MOD MDM: CPT | Mod: 25

## 2023-11-20 RX ORDER — CEFPODOXIME PROXETIL 100 MG
1 TABLET ORAL
Qty: 14 | Refills: 0
Start: 2023-11-20 | End: 2023-11-26

## 2023-11-20 RX ORDER — TRANEXAMIC ACID 100 MG/ML
2 INJECTION, SOLUTION INTRAVENOUS
Qty: 30 | Refills: 0
Start: 2023-11-20 | End: 2023-11-24

## 2023-11-20 RX ADMIN — TRANEXAMIC ACID 1300 MILLIGRAM(S): 100 INJECTION, SOLUTION INTRAVENOUS at 08:39

## 2023-11-20 NOTE — ED CDU PROVIDER SUBSEQUENT DAY NOTE - HISTORY
CDU PROGRESS NOTE PA EZEQUIEL: Pt resting comfortably, VSS. Abdomen soft, non distended. No rebound or guarding. Pt reports bleeding has decreased s/p TXA. Repeat H/H post 1 unit PRBC from 7.7/27.7 to 8.4/28.5. Will continue to monitor overnight.

## 2023-11-20 NOTE — PROGRESS NOTE ADULT - ASSESSMENT
A/P: 48y  LMP  initially presented to ED yesterday with heavy vaginal bleeding and has been observed in CDU overnight. Patient remains hemodynamically stable and comfortable. Physical exam and imaging findings suspicious for adenomyosis vs abnormal bleeding related to perimenopausal state. Per primary team, pt received 1u PRBCs with H/H trend 8.9/32.3->7.7/27.7->1u PRBCs->8.4/28.5. Patient is also s/p first two doses of Lysteda 1300mg. Pt is clinically stable and symptomatically improved.    Recommendations:  - Continue Lysteda 1300mg TID x5 days (two 650mg tablets three times a day, total daily dose 3900mg/day)  - Recommend follow up outpatient with primary OBGYN, or with gyn surgeon as referred by her outpatient OBGYN  - Remainder of care per ED     Plan previously d/w GYN service attending, Dr. Samayoa. Updates to be d/w GYN service attending, Dr. Leidy Jacobs PGY2

## 2023-11-20 NOTE — ED ADULT NURSE REASSESSMENT NOTE - NSFALLUNIVINTERV_ED_ALL_ED
Bed/Stretcher in lowest position, wheels locked, appropriate side rails in place/Call bell, personal items and telephone in reach/Instruct patient to call for assistance before getting out of bed/chair/stretcher/Non-slip footwear applied when patient is off stretcher/[place] to call system/Physically safe environment - no spills, clutter or unnecessary equipment/Purposeful proactive rounding/Room/bathroom lighting operational, light cord in reach
Bed/Stretcher in lowest position, wheels locked, appropriate side rails in place/Call bell, personal items and telephone in reach/Instruct patient to call for assistance before getting out of bed/chair/stretcher/Non-slip footwear applied when patient is off stretcher/[place] to call system/Physically safe environment - no spills, clutter or unnecessary equipment/Purposeful proactive rounding/Room/bathroom lighting operational, light cord in reach

## 2023-11-20 NOTE — ED CDU PROVIDER SUBSEQUENT DAY NOTE - PROGRESS NOTE DETAILS
Pt received at sign out, resting comfortably. Reports vaginal bleeding sig improved. Pt was reassessed by GYN who recommends pt start Lysteda 1300mg TID x5 days (two 650mg tablets three times a day, total daily dose 3900mg/day). They are aware pt just completed course of Agestin and received depot shot this week by her GYN. GYN still agrees with plan to start Lysteda. Pt agreeable as well. Will send rx for UTI also. Patient stable for discharge.  Follow up instructions given, return to ED precautions reviewed. Importance of follow up emphasized, patient verbalized understanding.  All questions answered. Case dw Dr. Leon. Marc Murray PA-C Pt received at sign out, resting comfortably. Reports vaginal bleeding sig improved. Repeat hbg with appropriate response after transfusion. Pt was reassessed by GYN who recommends pt start Lysteda 1300mg TID x5 days (two 650mg tablets three times a day, total daily dose 3900mg/day). They are aware pt just completed course of Agestin and received depot shot this week by her GYN. GYN still agrees with plan to start Lysteda. Pt agreeable as well. Will send rx for UTI also. Patient stable for discharge.  Follow up instructions given, return to ED precautions reviewed. Importance of follow up emphasized, patient verbalized understanding.  All questions answered. Case dw Dr. Leon. Marc Murray PA-C

## 2023-11-20 NOTE — ED CDU PROVIDER SUBSEQUENT DAY NOTE - CLINICAL SUMMARY MEDICAL DECISION MAKING FREE TEXT BOX
RGUJRAL 48-year-old female presented with vaginal bleeding and anemia.  Patient was evaluated by OB/GYN in the ED. Pt received 1 unit of blood transfusion. Patient states she feels much better this morning.  Discussed patient's results including urinalaysis.  Patient has follow-up with her GYN, has completed Aygestin for 14 days with them.  And received a Depo shot on Friday.  Confirmed with gynecology would like to discharge patient on lysteda and discussed with patient to discharge with antibiotics. Pt is on iron supplements. Patient has follow-up with GYN, requesting discharge has been ambulatory and tolerating PO, strict return precautions discussed.

## 2023-11-20 NOTE — ED ADULT NURSE REASSESSMENT NOTE - NS ED NURSE REASSESS COMMENT FT1
07.00 Received the Pt from  MIREYA Licea Pt is Observed for anemia /vaginal bleed . Received the Pt A&OX 4  Pt obeys commands Viviana N/V/D fever chills cp SOB   Comfort care & safety measures continued  IV site looks clean & dry no signs of infiltration noted pt denies  pain IV site .Pt is oriented to the unit Plan of care explained .  Pt is advised to call for help  call bell with in the reach pt verbalized the understanding .  pending CDU  MD lopez . GCS 15/15 A&OX 4 PERRLA  size 3 Strong upper & lower extremities steady gait  Pt is ambulatory & independent   No facial droop No Hand Leg drop denies numbness tingling  vaginal bleed is reduced  repeat HB is 8.4 Pt is for possible discharge Plan of care ongoing    10.00 Pt is evaluated by CDU MD Edward Downey . pt is feeling better.  Pt is discharged . Ml jv Tran  explained the follow up care & gave the discharge summary .Pt has stable vitals steady gait A&OX 4 at the time of Discharge
14.30 Received the Pt from  MIREYA Mendez  Pt is Observed for vaginal bleed for blood transfusion. Received the Pt A&OX 4  Pt obeys commands Viviana N/V/D fever chills cp SOB   Comfort care & safety measures continued  IV site looks clean & dry no signs of infiltration noted pt denies  pain IV site .Pt is oriented to the unit Plan of care explained .  Pt is advised to call for help  call bell with in the reach pt verbalized the understanding .  pending CDU  MD lopez .GCS 15/15 A&OX 4 PERRLA  size 3 Strong upper & lower extremities steady gait  Pt is ambulatory & independent   No facial droop  No Hand Leg drop denies numbness tingling  Pt states she is bleeding per vagina  Plan of care ongoing
Pt received from MIREYA Hamilton at 19:00hrs. Pt A&O x 4. Pt s/p one unit of PRBC, awaiting post transfusion CBC. Pt c/o lower abdominal pain, DONIS Kumar evaluated pt. Toradol given as ordered with good relief. Pt denies any chest pain, SOB, dizziness or palpitations. V/S stable, IV in place, patent and free of signs of infiltration. Pt resting in bed. Family at bed side. Safety & comfort measures maintained. Call bell in reach. Will continue to monitor.

## 2023-11-20 NOTE — ED CDU PROVIDER SUBSEQUENT DAY NOTE - PHYSICAL EXAMINATION
GENERAL: NAD  HEENT:  Atraumatic  CHEST/LUNG: Chest rise equal bilaterally  HEART: Regular rate and rhythm  ABDOMEN: Soft, LLQ abd TTP, Nondistended. No CVA TTP  EXTREMITIES:  Extremities warm  PSYCH: A&Ox3  SKIN: No obvious rashes or lesions  NEUROLOGY: strength and sensation intact in all extremities. Ambulatory without difficulty.

## 2023-11-20 NOTE — PROGRESS NOTE ADULT - SUBJECTIVE AND OBJECTIVE BOX
R2 GYN Progress Note    HD#2    Patient seen and examined at bedside in CDU. No acute events overnight, no acute complaints. Pt states her vaginal bleeding is decreasing and she was able to get sleep overnight. Denies lightheadedness, dizziness, headache, CP, SOB, pelvic pain, heavy VB.     Vital Signs Last 24 Hours  T(C): 36.8 (11-20-23 @ 04:36), Max: 37.3 (11-19-23 @ 20:10)  HR: 72 (11-20-23 @ 04:36) (70 - 97)  BP: 107/66 (11-20-23 @ 04:36) (107/66 - 175/75)  RR: 17 (11-20-23 @ 04:36) (17 - 20)  SpO2: 99% (11-20-23 @ 04:36) (98% - 100%)    Physical Exam:  General: NAD  CV: extremities well perfused  Lungs: normal respiratory effort on room air  : scant blood on pad, in place for approx 1hr  Ext: No pain or swelling in lower extremities bilaterally     Labs:                        8.4    6.18  )-----------( 327      ( 19 Nov 2023 21:53 )             28.5   baso 0.3    eos 1.8    imm gran 0.3    lymph 31.9   mono 6.6    poly 59.1                         7.7    5.14  )-----------( 366      ( 19 Nov 2023 11:27 )             27.7   baso x      eos x      imm gran x      lymph x      mono x      poly x                            8.9    5.68  )-----------( 430      ( 19 Nov 2023 08:48 )             32.3   baso 0.0    eos 0.0    imm gran x      lymph 21.9   mono 6.2    poly 71.9     MEDICATIONS  (STANDING):  tranexamic  acid 1300 milliGRAM(s) Oral three times a day

## 2023-11-21 LAB
CULTURE RESULTS: SIGNIFICANT CHANGE UP
CULTURE RESULTS: SIGNIFICANT CHANGE UP
SPECIMEN SOURCE: SIGNIFICANT CHANGE UP
SPECIMEN SOURCE: SIGNIFICANT CHANGE UP